# Patient Record
Sex: MALE | Race: WHITE | NOT HISPANIC OR LATINO | Employment: OTHER | URBAN - NONMETROPOLITAN AREA
[De-identification: names, ages, dates, MRNs, and addresses within clinical notes are randomized per-mention and may not be internally consistent; named-entity substitution may affect disease eponyms.]

---

## 2017-10-23 RX ORDER — ASPIRIN 81 MG/1
81 TABLET, CHEWABLE ORAL DAILY
COMMUNITY

## 2017-10-23 RX ORDER — OMEPRAZOLE 20 MG/1
20 CAPSULE, DELAYED RELEASE ORAL DAILY PRN
Status: ON HOLD | COMMUNITY
End: 2017-10-27

## 2017-10-23 RX ORDER — PREGABALIN 100 MG/1
100 CAPSULE ORAL 2 TIMES DAILY
COMMUNITY
End: 2018-03-13 | Stop reason: ALTCHOICE

## 2017-10-23 RX ORDER — MELOXICAM 15 MG/1
15 TABLET ORAL DAILY
COMMUNITY
End: 2020-01-08

## 2017-10-23 RX ORDER — ATENOLOL 25 MG/1
25 TABLET ORAL DAILY
COMMUNITY
End: 2020-01-08

## 2017-10-27 ENCOUNTER — ANESTHESIA (OUTPATIENT)
Dept: GASTROENTEROLOGY | Facility: HOSPITAL | Age: 52
End: 2017-10-27

## 2017-10-27 ENCOUNTER — ANESTHESIA EVENT (OUTPATIENT)
Dept: GASTROENTEROLOGY | Facility: HOSPITAL | Age: 52
End: 2017-10-27

## 2017-10-27 ENCOUNTER — HOSPITAL ENCOUNTER (OUTPATIENT)
Facility: HOSPITAL | Age: 52
Setting detail: HOSPITAL OUTPATIENT SURGERY
Discharge: HOME | End: 2017-10-27
Attending: INTERNAL MEDICINE | Admitting: INTERNAL MEDICINE

## 2017-10-27 VITALS
OXYGEN SATURATION: 98 % | WEIGHT: 269.25 LBS | BODY MASS INDEX: 38.55 KG/M2 | RESPIRATION RATE: 18 BRPM | HEIGHT: 70 IN | TEMPERATURE: 97.3 F | DIASTOLIC BLOOD PRESSURE: 56 MMHG | HEART RATE: 77 BPM | SYSTOLIC BLOOD PRESSURE: 119 MMHG

## 2017-10-27 PROCEDURE — 45378 DIAGNOSTIC COLONOSCOPY: CPT | Performed by: INTERNAL MEDICINE

## 2017-10-27 PROCEDURE — 25010000002 PROPOFOL 10 MG/ML EMULSION: Performed by: NURSE ANESTHETIST, CERTIFIED REGISTERED

## 2017-10-27 PROCEDURE — 25010000002 MIDAZOLAM PER 1 MG: Performed by: NURSE ANESTHETIST, CERTIFIED REGISTERED

## 2017-10-27 RX ORDER — DEXAMETHASONE SODIUM PHOSPHATE 4 MG/ML
8 INJECTION, SOLUTION INTRA-ARTICULAR; INTRALESIONAL; INTRAMUSCULAR; INTRAVENOUS; SOFT TISSUE ONCE AS NEEDED
Status: DISCONTINUED | OUTPATIENT
Start: 2017-10-27 | End: 2017-10-27 | Stop reason: HOSPADM

## 2017-10-27 RX ORDER — PROMETHAZINE HYDROCHLORIDE 25 MG/1
25 SUPPOSITORY RECTAL ONCE AS NEEDED
Status: DISCONTINUED | OUTPATIENT
Start: 2017-10-27 | End: 2017-10-27 | Stop reason: HOSPADM

## 2017-10-27 RX ORDER — DEXTROSE AND SODIUM CHLORIDE 5; .45 G/100ML; G/100ML
30 INJECTION, SOLUTION INTRAVENOUS CONTINUOUS
Status: DISCONTINUED | OUTPATIENT
Start: 2017-10-27 | End: 2017-10-27 | Stop reason: HOSPADM

## 2017-10-27 RX ORDER — PROPOFOL 10 MG/ML
VIAL (ML) INTRAVENOUS AS NEEDED
Status: DISCONTINUED | OUTPATIENT
Start: 2017-10-27 | End: 2017-10-27 | Stop reason: SURG

## 2017-10-27 RX ORDER — PROMETHAZINE HYDROCHLORIDE 25 MG/ML
12.5 INJECTION, SOLUTION INTRAMUSCULAR; INTRAVENOUS ONCE AS NEEDED
Status: DISCONTINUED | OUTPATIENT
Start: 2017-10-27 | End: 2017-10-27 | Stop reason: HOSPADM

## 2017-10-27 RX ORDER — LIDOCAINE HYDROCHLORIDE 10 MG/ML
INJECTION, SOLUTION INFILTRATION; PERINEURAL AS NEEDED
Status: DISCONTINUED | OUTPATIENT
Start: 2017-10-27 | End: 2017-10-27 | Stop reason: SURG

## 2017-10-27 RX ORDER — ONDANSETRON 2 MG/ML
4 INJECTION INTRAMUSCULAR; INTRAVENOUS ONCE AS NEEDED
Status: DISCONTINUED | OUTPATIENT
Start: 2017-10-27 | End: 2017-10-27 | Stop reason: HOSPADM

## 2017-10-27 RX ORDER — MIDAZOLAM HYDROCHLORIDE 1 MG/ML
INJECTION INTRAMUSCULAR; INTRAVENOUS AS NEEDED
Status: DISCONTINUED | OUTPATIENT
Start: 2017-10-27 | End: 2017-10-27 | Stop reason: SURG

## 2017-10-27 RX ORDER — PROMETHAZINE HYDROCHLORIDE 25 MG/1
25 TABLET ORAL ONCE AS NEEDED
Status: DISCONTINUED | OUTPATIENT
Start: 2017-10-27 | End: 2017-10-27 | Stop reason: HOSPADM

## 2017-10-27 RX ADMIN — PROPOFOL 100 MG: 10 INJECTION, EMULSION INTRAVENOUS at 15:42

## 2017-10-27 RX ADMIN — LIDOCAINE HYDROCHLORIDE 100 MG: 10 INJECTION, SOLUTION INFILTRATION; PERINEURAL at 15:42

## 2017-10-27 RX ADMIN — DEXTROSE AND SODIUM CHLORIDE 30 ML/HR: 5; 450 INJECTION, SOLUTION INTRAVENOUS at 14:06

## 2017-10-27 RX ADMIN — PROPOFOL 20 MG: 10 INJECTION, EMULSION INTRAVENOUS at 15:48

## 2017-10-27 RX ADMIN — PROPOFOL 30 MG: 10 INJECTION, EMULSION INTRAVENOUS at 15:45

## 2017-10-27 RX ADMIN — PROPOFOL 50 MG: 10 INJECTION, EMULSION INTRAVENOUS at 15:50

## 2017-10-27 RX ADMIN — MIDAZOLAM 2 MG: 1 INJECTION INTRAMUSCULAR; INTRAVENOUS at 15:40

## 2017-10-27 NOTE — PLAN OF CARE
Problem: Patient Care Overview (Adult)  Goal: Plan of Care Review    10/27/17 1551   Coping/Psychosocial Response Interventions   Plan Of Care Reviewed With patient   Patient Care Overview   Progress no change   Outcome Evaluation   Outcome Summary/Follow up Plan vss         Problem: GI Endoscopy (Adult)  Goal: Signs and Symptoms of Listed Potential Problems Will be Absent or Manageable (GI Endoscopy)    10/27/17 1551   GI Endoscopy   Problems Assessed (GI Endoscopy) all   Problems Present (GI Endoscopy) none

## 2017-10-27 NOTE — ANESTHESIA PREPROCEDURE EVALUATION
Anesthesia Evaluation     NPO Solid Status: > 8 hours  NPO Liquid Status: > 4 hours     Airway   Mallampati: III  TM distance: <3 FB  Neck ROM: full  difficult intubation highly probable  Dental          Pulmonary - normal exam   (+) sleep apnea on CPAP,   Cardiovascular - normal exam        Neuro/Psych  GI/Hepatic/Renal/Endo      Musculoskeletal     Abdominal  - normal exam  (+) obese,    Substance History      OB/GYN          Other                                        Anesthesia Plan    ASA 2     MAC     intravenous induction   Anesthetic plan and risks discussed with patient.

## 2017-10-27 NOTE — H&P
SUBJECTIVE:   10/27/2017    Name: Nicholas Negrete  DOD: 1965    REASON FOR CONSULT: screening colon    Chief Complaint:   No chief complaint on file.      Subjective     Patient is 52 y.o. male presents for screening     ROS/HISTORY/ CURRENT MEDICATIONS/OBJECTIVE/VS/PE:   Review of Systems:   Review of Systems    History:     Past Medical History:   Diagnosis Date   • Arthritis    • GERD (gastroesophageal reflux disease)    • Hypertension      Past Surgical History:   Procedure Laterality Date   • BACK SURGERY      L4 and L5 fused     History reviewed. No pertinent family history.  Social History   Substance Use Topics   • Smoking status: Never Smoker   • Smokeless tobacco: Never Used   • Alcohol use No     Prescriptions Prior to Admission   Medication Sig Dispense Refill Last Dose   • aspirin 81 MG chewable tablet Chew 81 mg Daily.   10/26/2017 at Unknown time   • atenolol (TENORMIN) 25 MG tablet Take 25 mg by mouth Daily.   10/26/2017 at Unknown time   • meloxicam (MOBIC) 15 MG tablet Take 15 mg by mouth Daily.   10/23/2017   • pregabalin (LYRICA) 100 MG capsule Take 100 mg by mouth 2 (Two) Times a Day.   10/26/2017 at Unknown time     Allergies:  Penicillins    I have reviewed the patients medical history, surgical history and family history in the available medical record system.     Current Medications:     Current Facility-Administered Medications   Medication Dose Route Frequency Provider Last Rate Last Dose   • dexamethasone (DECADRON) injection 8 mg  8 mg Intravenous Once PRN Meryl Victoria CRNA       • dextrose 5 % and sodium chloride 0.45 % infusion  30 mL/hr Intravenous Continuous Tai Hayes MD 30 mL/hr at 10/27/17 1406 30 mL/hr at 10/27/17 1406   • meperidine (DEMEROL) injection 12.5 mg  12.5 mg Intravenous Q5 Min PRN Meryl Victoria CRNA       • ondansetron (ZOFRAN) injection 4 mg  4 mg Intravenous Once PRN Meryl Victoria CRNA       • promethazine (PHENERGAN) injection  12.5 mg  12.5 mg Intravenous Once PRN Meryl Victoria CRNA        Or   • promethazine (PHENERGAN) injection 12.5 mg  12.5 mg Intramuscular Once PRN Meryl Victoria CRNA        Or   • promethazine (PHENERGAN) suppository 25 mg  25 mg Rectal Once PRN Meryl Victoria CRNA        Or   • promethazine (PHENERGAN) tablet 25 mg  25 mg Oral Once PRN Meryl Victoria CRNA           Objective     Physical Exam:   Temp:  [97.9 °F (36.6 °C)] 97.9 °F (36.6 °C)  Heart Rate:  [96] 96  Resp:  [18] 18  BP: (167)/(103) 167/103    Physical Exam:  General Appearance:    Alert, cooperative, in no acute distress   Head:    Normocephalic, without obvious abnormality, atraumatic   Eyes:            Lids and lashes normal, conjunctivae and sclerae normal, no   icterus, no pallor, corneas clear, PERRLA   Ears:    Ears appear intact with no abnormalities noted   Throat:   No oral lesions, no thrush, oral mucosa moist   Neck:   No adenopathy, supple, trachea midline, no thyromegaly, no     carotid bruit, no JVD   Back:     No kyphosis present, no scoliosis present, no skin lesions,       erythema or scars, no tenderness to percussion or                   palpation,   range of motion normal   Lungs:     Clear to auscultation,respirations regular, even and                   unlabored    Heart:    Regular rhythm and normal rate, normal S1 and S2, no            murmur, no gallop, no rub, no click   Breast Exam:    Deferred   Abdomen:     Normal bowel sounds, no masses, no organomegaly, soft        non-tender, non-distended, no guarding, no rebound                 tenderness   Genitalia:    Deferred   Extremities:   Moves all extremities well, no edema, no cyanosis, no              redness   Pulses:   Pulses palpable and equal bilaterally   Skin:   No bleeding, bruising or rash   Lymph nodes:   No palpable adenopathy   Neurologic:   Cranial nerves 2 - 12 grossly intact, sensation intact, DTR        present and equal bilaterally       Results Review:     No results found for: WBC, HGB, HCT, PLT          No results found for: LIPASE  No results found for: INR      Radiology Review:  Imaging Results (last 72 hours)     ** No results found for the last 72 hours. **           I reviewed the patient's new clinical results.  I reviewed the patient's new imaging results and agree with the interpretation.     ASSESSMENT/PLAN:   ASSESSMENT: pt for screening colonoscopy    PLAN: screening colonoscopy  The risks, benefits, and alternatives of this procedure have been discussed with the patient or the responsible party- the patient understands and agrees to proceed.         Tai Hayes MD  10/27/17  3:09 PM         This document has been electronically signed by Tai Hayes MD on October 27, 2017 3:09 PM

## 2017-10-27 NOTE — PLAN OF CARE
Problem: Patient Care Overview (Adult)  Goal: Plan of Care Review  Outcome: Outcome(s) achieved Date Met:  10/27/17    10/27/17 1606   Coping/Psychosocial Response Interventions   Plan Of Care Reviewed With patient   Patient Care Overview   Progress no change         Problem: GI Endoscopy (Adult)  Goal: Signs and Symptoms of Listed Potential Problems Will be Absent or Manageable (GI Endoscopy)  Outcome: Outcome(s) achieved Date Met:  10/27/17    10/27/17 1606   GI Endoscopy   Problems Assessed (GI Endoscopy) all   Problems Present (GI Endoscopy) none

## 2017-10-27 NOTE — PLAN OF CARE
Problem: Patient Care Overview (Adult)  Goal: Plan of Care Review    10/27/17 1552   Coping/Psychosocial Response Interventions   Plan Of Care Reviewed With patient   Patient Care Overview   Progress no change

## 2018-03-13 RX ORDER — ZOLPIDEM TARTRATE 10 MG/1
5 TABLET ORAL DAILY
COMMUNITY

## 2018-03-13 RX ORDER — LISINOPRIL 40 MG/1
40 TABLET ORAL DAILY
COMMUNITY
End: 2020-01-08

## 2018-03-27 ENCOUNTER — OFFICE VISIT (OUTPATIENT)
Dept: BARIATRICS/WEIGHT MGMT | Facility: CLINIC | Age: 53
End: 2018-03-27

## 2018-03-27 ENCOUNTER — OFFICE VISIT (OUTPATIENT)
Dept: BARIATRICS/WEIGHT MGMT | Facility: HOSPITAL | Age: 53
End: 2018-03-27

## 2018-03-27 VITALS
BODY MASS INDEX: 40.76 KG/M2 | SYSTOLIC BLOOD PRESSURE: 151 MMHG | WEIGHT: 275.2 LBS | TEMPERATURE: 99.8 F | HEIGHT: 69 IN | OXYGEN SATURATION: 100 % | DIASTOLIC BLOOD PRESSURE: 78 MMHG | HEART RATE: 75 BPM

## 2018-03-27 DIAGNOSIS — G47.33 OSA ON CPAP: ICD-10-CM

## 2018-03-27 DIAGNOSIS — I10 ESSENTIAL HYPERTENSION: ICD-10-CM

## 2018-03-27 DIAGNOSIS — E66.01 OBESITY, CLASS III, BMI 40-49.9 (MORBID OBESITY) (HCC): Primary | ICD-10-CM

## 2018-03-27 DIAGNOSIS — Z99.89 OSA ON CPAP: ICD-10-CM

## 2018-03-27 DIAGNOSIS — Z71.89 PRE-BARIATRIC SURGERY PSYCHOLOGICAL EVALUATION: ICD-10-CM

## 2018-03-27 PROCEDURE — 99203 OFFICE O/P NEW LOW 30 MIN: CPT | Performed by: NURSE PRACTITIONER

## 2018-03-27 PROCEDURE — 97802 MEDICAL NUTRITION INDIV IN: CPT

## 2018-03-27 NOTE — PATIENT INSTRUCTIONS
Nicholas Negrete is a  52 y.o. who has morbid obesity with BMI of 40.6 and desires surgical weight loss. Patient has the following comorbidities: HTN, LORI, joint, back pain.  Patient has been advised that this surgery is considered to be elective major surgery that is typically done laparoscopically. Patient is a potentially good surgical candidate. Patient will need to meet with the Bariatric Surgeon to further discuss surgical options. Patient has been advised that they will need to have a work-up prior to surgery. This work-up will include an EGD to assess for H. Pylori and Lopez's esophagus. Additionally, patient will need to have a psychological evaluation and clearance prior to surgery. Patient will need the following additional clearances/testing: EKG. Baseline lab work will not be obtained today as PCP is following. Patient will see the dietician today for make specific goals for diet, exercise, and lifestyle. Patient has received intensive behavioral therapy for obesity today. I will have them follow up in one month for a weight recheck and to further discuss options.

## 2018-04-02 ENCOUNTER — TELEPHONE (OUTPATIENT)
Dept: BARIATRICS/WEIGHT MGMT | Facility: CLINIC | Age: 53
End: 2018-04-02

## 2018-04-02 NOTE — TELEPHONE ENCOUNTER
I called the patient to clarify his [facility] coverage and he said unfortunately that he can only see [facility] providers and go to [facility] facilities.

## 2018-04-20 ENCOUNTER — TRANSCRIBE ORDERS (OUTPATIENT)
Dept: LAB | Facility: HOSPITAL | Age: 53
End: 2018-04-20

## 2018-04-20 ENCOUNTER — LAB (OUTPATIENT)
Dept: LAB | Facility: HOSPITAL | Age: 53
End: 2018-04-20

## 2018-04-20 ENCOUNTER — RESULTS ENCOUNTER (OUTPATIENT)
Dept: BARIATRICS/WEIGHT MGMT | Facility: CLINIC | Age: 53
End: 2018-04-20

## 2018-04-20 DIAGNOSIS — Z30.2 ADMISSION FOR STERILIZATION: Primary | ICD-10-CM

## 2018-04-20 DIAGNOSIS — E66.01 OBESITY, CLASS III, BMI 40-49.9 (MORBID OBESITY) (HCC): ICD-10-CM

## 2018-04-20 DIAGNOSIS — Z30.2 ADMISSION FOR STERILIZATION: ICD-10-CM

## 2018-04-20 LAB
MOTILITY - POST VASECTOMY: ABNORMAL
SPERM - POST VASECTOMY: ABNORMAL

## 2018-04-20 PROCEDURE — 89321 SEMEN ANAL SPERM DETECTION: CPT

## 2018-05-18 ENCOUNTER — RESULTS ENCOUNTER (OUTPATIENT)
Dept: BARIATRICS/WEIGHT MGMT | Facility: CLINIC | Age: 53
End: 2018-05-18

## 2018-05-18 DIAGNOSIS — E66.01 OBESITY, CLASS III, BMI 40-49.9 (MORBID OBESITY) (HCC): ICD-10-CM

## 2018-06-15 ENCOUNTER — RESULTS ENCOUNTER (OUTPATIENT)
Dept: BARIATRICS/WEIGHT MGMT | Facility: CLINIC | Age: 53
End: 2018-06-15

## 2018-06-15 DIAGNOSIS — E66.01 OBESITY, CLASS III, BMI 40-49.9 (MORBID OBESITY) (HCC): ICD-10-CM

## 2018-07-13 ENCOUNTER — RESULTS ENCOUNTER (OUTPATIENT)
Dept: BARIATRICS/WEIGHT MGMT | Facility: CLINIC | Age: 53
End: 2018-07-13

## 2018-07-13 DIAGNOSIS — E66.01 OBESITY, CLASS III, BMI 40-49.9 (MORBID OBESITY) (HCC): ICD-10-CM

## 2018-08-10 ENCOUNTER — RESULTS ENCOUNTER (OUTPATIENT)
Dept: BARIATRICS/WEIGHT MGMT | Facility: CLINIC | Age: 53
End: 2018-08-10

## 2018-08-10 DIAGNOSIS — E66.01 OBESITY, CLASS III, BMI 40-49.9 (MORBID OBESITY) (HCC): ICD-10-CM

## 2018-09-07 ENCOUNTER — RESULTS ENCOUNTER (OUTPATIENT)
Dept: BARIATRICS/WEIGHT MGMT | Facility: CLINIC | Age: 53
End: 2018-09-07

## 2018-09-07 DIAGNOSIS — E66.01 OBESITY, CLASS III, BMI 40-49.9 (MORBID OBESITY) (HCC): ICD-10-CM

## 2018-10-05 ENCOUNTER — RESULTS ENCOUNTER (OUTPATIENT)
Dept: BARIATRICS/WEIGHT MGMT | Facility: CLINIC | Age: 53
End: 2018-10-05

## 2018-10-05 DIAGNOSIS — E66.01 OBESITY, CLASS III, BMI 40-49.9 (MORBID OBESITY) (HCC): ICD-10-CM

## 2018-11-02 ENCOUNTER — RESULTS ENCOUNTER (OUTPATIENT)
Dept: BARIATRICS/WEIGHT MGMT | Facility: CLINIC | Age: 53
End: 2018-11-02

## 2018-11-02 DIAGNOSIS — E66.01 OBESITY, CLASS III, BMI 40-49.9 (MORBID OBESITY) (HCC): ICD-10-CM

## 2018-11-30 ENCOUNTER — RESULTS ENCOUNTER (OUTPATIENT)
Dept: BARIATRICS/WEIGHT MGMT | Facility: CLINIC | Age: 53
End: 2018-11-30

## 2018-11-30 DIAGNOSIS — E66.01 OBESITY, CLASS III, BMI 40-49.9 (MORBID OBESITY) (HCC): ICD-10-CM

## 2018-12-28 ENCOUNTER — RESULTS ENCOUNTER (OUTPATIENT)
Dept: BARIATRICS/WEIGHT MGMT | Facility: CLINIC | Age: 53
End: 2018-12-28

## 2018-12-28 DIAGNOSIS — E66.01 OBESITY, CLASS III, BMI 40-49.9 (MORBID OBESITY) (HCC): ICD-10-CM

## 2019-01-25 ENCOUNTER — RESULTS ENCOUNTER (OUTPATIENT)
Dept: BARIATRICS/WEIGHT MGMT | Facility: CLINIC | Age: 54
End: 2019-01-25

## 2019-01-25 DIAGNOSIS — E66.01 OBESITY, CLASS III, BMI 40-49.9 (MORBID OBESITY) (HCC): ICD-10-CM

## 2019-02-07 ENCOUNTER — TRANSCRIBE ORDERS (OUTPATIENT)
Dept: PHYSICAL THERAPY | Facility: HOSPITAL | Age: 54
End: 2019-02-07

## 2019-02-07 DIAGNOSIS — M25.562 LEFT KNEE PAIN, UNSPECIFIED CHRONICITY: Primary | ICD-10-CM

## 2019-02-07 DIAGNOSIS — M17.12 ARTHRITIS OF LEFT KNEE: ICD-10-CM

## 2019-02-08 ENCOUNTER — HOSPITAL ENCOUNTER (OUTPATIENT)
Dept: PHYSICAL THERAPY | Facility: HOSPITAL | Age: 54
Setting detail: THERAPIES SERIES
Discharge: HOME | End: 2019-02-08

## 2019-02-08 DIAGNOSIS — M17.12 PRIMARY OSTEOARTHRITIS OF LEFT KNEE: ICD-10-CM

## 2019-02-08 DIAGNOSIS — Z96.652 TOTAL KNEE REPLACEMENT STATUS, LEFT: Primary | ICD-10-CM

## 2019-02-08 PROCEDURE — 97110 THERAPEUTIC EXERCISES: CPT | Performed by: PHYSICAL THERAPIST

## 2019-02-08 PROCEDURE — G0283 ELEC STIM OTHER THAN WOUND: HCPCS | Performed by: PHYSICAL THERAPIST

## 2019-02-08 PROCEDURE — 97161 PT EVAL LOW COMPLEX 20 MIN: CPT | Performed by: PHYSICAL THERAPIST

## 2019-02-08 NOTE — THERAPY EVALUATION
Outpatient Physical Therapy Ortho Initial Evaluation  [facility]     Patient Name: Nicholas Negrete  : 1965  MRN: [id number]  Today's Date: 2019      Visit Date: 2019      Attendance    Authorized 17   Pre Rx pain 6   Post Rx pain 4-5   % improvement 0   MD follow up    Recert date            Subjective Evaluation    History of Present Illness  Onset date: over a year.  Date of surgery: 2019    Subjective comment: Has a CPM at home.  [facility]  MMT, with scope.  Patient Occupation: Retired/ Disabled [facility] Quality of life: good    Pain  Current pain ratin  At best pain ratin  At worst pain ratin  Location: left knee  Quality: dull ache, throbbing and pressure  Relieving factors: ice and rest (CPM)  Aggravating factors: ambulation, stairs, standing and movement  Progression: improved    Social Support  Lives in: one-story house  Lives with: spouse and young children    Hand dominance: right    Diagnostic Tests  X-ray: abnormal    Treatments  Previous treatment: injection treatment  Patient Goals  Patient goals for therapy: decreased edema, decreased pain, increased motion, increased strength and independence with ADLs/IADLs               Past Medical History:   Diagnosis Date   • Anxiety    • Arthritis    • Back pain    • Fibromyalgia    • GERD (gastroesophageal reflux disease)    • Hemorrhoids    • Hx of heartburn    • Hypertension    • Pain     hip, joint, neck, knee, back    • Problems with hearing    • Sleep apnea    • Sleep apnea     uses Cpap/Bipap         Past Surgical History:   Procedure Laterality Date   • BACK SURGERY      L4 and L5 fused   • BACK SURGERY     • COLONOSCOPY N/A 10/27/2017    Procedure: COLONOSCOPY;  Surgeon: Tai Hayes MD;  Location: [facility] ENDOSCOPY;  Service:      Medications (Admitted on 2019)    aspirin 81 MG chewable tablet    atenolol (TENORMIN) 25 MG tablet    lisinopril (PRINIVIL,ZESTRIL) 40 MG  tablet    meloxicam (MOBIC) 15 MG tablet    OMEPRAZOLE PO    Pregabalin (LYRICA PO)    zolpidem (AMBIEN) 10 MG tablet               Norco  ALLERGIES: Penicillins    Visit Dx:     ICD-10-CM ICD-9-CM   1. Total knee replacement status, left Z96.652 V43.65   2. Primary osteoarthritis of left knee M17.12 715.16           Objective       Observations   Left Knee   Positive for incision.     Additional Observation Details  Covered with non occlusive bandage.    Neurological Testing     Sensation     Knee   Left Knee   Diminished: light touch     Comments   Left light touch: lateral knee.     Active Range of Motion   Left Knee   Flexion: 78 (90 post heelslides) degrees     Patellar Static Positioning   Left Knee: WFL  Right Knee: WFL    Strength/Myotome Testing     Left Knee   Quadriceps contraction: good    Swelling     Left Knee Girth Measurement (cm)   Joint line: 39.5.    Ambulation   Weight-Bearing Status   Weight-Bearing Status (Left): weight-bearing as tolerated   Weight-Bearing Status (Right): full weight-bearing    Assistive device used: front-wheeled walker                           Assessment & Plan     Assessment  Impairments: abnormal gait, abnormal or restricted ROM, activity intolerance, lacks appropriate home exercise program and pain with function  Assessment details: Post op status TKA  Sx 2/4 .  Skilled therapy indicated for post operative care of wound, gait, ROM and strength  Prognosis: good  Functional Limitations: walking and standing  Goals  Plan Goals: ST. Pt will have independence in HEP  2. Pt will have AROM 105 degrees  Knee flexion  3. Pt will have 0 deg AROM knee ext.  4. Pt will amb with cane in 3 weeks.  5. Pt will be able to SLR with no lag    PT goal  1. Be able to play recreational sports things with kids.  LT.Pt will have 120 deg knee flexion  2.Pt will ambulate with NAG and no AD.   3.Pt will have decreased swelling to 38 cm at MJL  4. Pt will have 5/5 MMT quads and hams  5.  Pt will be able to do 10 sit to stands without UE support        Plan  Therapy options: will be seen for skilled physical therapy services  Planned modality interventions: cryotherapy and electrical stimulation/Russian stimulation  Planned therapy interventions: stretching, strengthening, gait training and home exercise program  Frequency: 3x week  Duration in weeks: 3  Plan details: 3x3, 2 x3 1x2.   ROM , strength and  Gait. Edema control, Ice and stim.         PT OP Goals     Row Name 02/08/19 0937          PT Short Term Goals    STG Date to Achieve  03/01/19  -DD     STG 2  Patient will be independent home exercise program patient will have active range of motion 105 degrees left knee  -DD     STG 3  Patient will have 0 degrees active range of motion left knee extension  -DD     STG 4  Patient was able to ambulate with a cane in 3 weeks  -DD     STG 5  Patient will be able to straight leg raise without extensor lag  -DD        Long Term Goals    LTG 1  Patient will have 120 degrees active knee flexion  -DD     LTG 2  Patient will ambulate with nonantalgic gait and no assistive device  -DD     LTG 3  Patient will have decreased swelling to 38 cm at the medial joint line  -DD     LTG 4  Patient will have 5/5 manual muscle testing of quads and hamstrings  -DD     LTG 5  Patient be able to do sit to stands 10 reps without upper extremity support  -DD        Time Calculation    PT Goal Re-Cert Due Date  03/01/19  -DD       User Key  (r) = Recorded By, (t) = Taken By, (c) = Cosigned By    Initials Name Provider Type    DD Jayashree Encinas, PT DPT Physical Therapist              Modalities     Row Name 02/08/19 0900             Ice    Ice Applied  Yes  -DD      Location  left knee  -DD      Rx Minutes  15 mins  -DD      Ice S/P Rx  Yes  -DD         ELECTRICAL STIMULATION    Attended/Unattended  Unattended  -DD      Stimulation Type  IFC 15 min  -DD      Max mAmp  25  -DD      Location/Electrode Placement/Other   "left knee  -DD        User Key  (r) = Recorded By, (t) = Taken By, (c) = Cosigned By    Initials Name Provider Type    Jayashree Metz, PT DPT Physical Therapist        Exercises     Row Name 02/08/19 0900             Exercise 1    Exercise Name 1  hamstring/calf stretch with strap  -DD      Reps 1  2/30\"  -DD         Exercise 2    Exercise Name 2  quad sets  -DD      Reps 2  20  -DD         Exercise 3    Exercise Name 3  SLR  -DD      Reps 3  20  -DD         Exercise 4    Exercise Name 4  SAQ  -DD      Reps 4  20  -DD         Exercise 5    Exercise Name 5  iso add  -DD      Reps 5  20  -DD         Exercise 6    Exercise Name 6  LAQ  -DD      Reps 6  20  -DD         Exercise 7    Exercise Name 7  heel slides with strap  -DD      Reps 7  20  -DD        User Key  (r) = Recorded By, (t) = Taken By, (c) = Cosigned By    Initials Name Provider Type    Jayashree Metz, PT DPT Physical Therapist            Outcome Measure Options: Lower Extremity Functional Scale (LEFS)  Lower Extremity Functional Index  Any of your usual work, housework or school activities: Quite a bit of difficulty  Your usual hobbies, recreational or sporting activities: Extreme difficulty or unable to perform activity  Getting into or out of the bath: Quite a bit of difficulty  Walking between rooms: Moderate difficulty  Putting on your shoes or socks: Quite a bit of difficulty  Squatting: Extreme difficulty or unable to perform activity  Lifting an object, like a bag of groceries from the floor: Extreme difficulty or unable to perform activity  Performing light activities around your home: Quite a bit of difficulty  Performing heavy activities around your home: Extreme difficulty or unable to perform activity  Getting into or out of a car: Quite a bit of difficulty  Walking 2 blocks: Quite a bit of difficulty  Walking a mile: Extreme difficulty or unable to perform activity  Going up or down 10 stairs (about 1 flight of stairs): " Quite a bit of difficulty  Standing for 1 hour: Extreme difficulty or unable to perform activity  Sitting for 1 hour: Extreme difficulty or unable to perform activity  Running on even ground: Extreme difficulty or unable to perform activity  Running on uneven ground: Extreme difficulty or unable to perform activity  Making sharp turns while running fast: Extreme difficulty or unable to perform activity  Hopping: Extreme difficulty or unable to perform activity  Rolling over in bed: Quite a bit of difficulty  Total: 10    Time Calculation:     Therapy Suggested Charges     Code   Minutes Charges    None             Start Time: 0845  Stop Time: 0947  Time Calculation (min): 62 min  Total Timed Code Minutes- PT: 25 minute(s)     Therapy Charges for Today     Code Description Service Date Service Provider Modifiers Qty    42426888092 HC PT EVAL LOW COMPLEXITY 1 2/8/2019 Jayashree Encinas, PT DPT GP 1    71777914873 HC PT THER PROC EA 15 MIN 2/8/2019 Jayashree Encinas, PT DPT GP 2    36689570353 HC PT ELECTRICAL STIM UNATTENDED 2/8/2019 Jayashree Encinas, PT DPT  1        PT G-Codes  Outcome Measure Options: Lower Extremity Functional Scale (LEFS)  Total: 10     Jayashree Encinas, PT DPT  2/8/2019

## 2019-02-11 ENCOUNTER — HOSPITAL ENCOUNTER (OUTPATIENT)
Dept: PHYSICAL THERAPY | Facility: HOSPITAL | Age: 54
Setting detail: THERAPIES SERIES
Discharge: HOME | End: 2019-02-11

## 2019-02-11 DIAGNOSIS — Z96.652 TOTAL KNEE REPLACEMENT STATUS, LEFT: Primary | ICD-10-CM

## 2019-02-11 PROCEDURE — G0283 ELEC STIM OTHER THAN WOUND: HCPCS

## 2019-02-11 PROCEDURE — 97110 THERAPEUTIC EXERCISES: CPT

## 2019-02-11 NOTE — THERAPY TREATMENT NOTE
Outpatient Physical Therapy Ortho Treatment Note  [facility]     Patient Name: Nicholas Negrete  : 1965  MRN: [id number]  Today's Date: 2019      Visit Date: 2019   Attendance:   Subjective improvement: 65%  Recert:19  MD Appointment: 19      Visit Dx:    ICD-10-CM ICD-9-CM   1. Total knee replacement status, left Z96.652 V43.65       There is no problem list on file for this patient.       Past Medical History:   Diagnosis Date   • Anxiety    • Arthritis    • Back pain    • Fibromyalgia    • GERD (gastroesophageal reflux disease)    • Hemorrhoids    • Hx of heartburn    • Hypertension    • Pain     hip, joint, neck, knee, back    • Problems with hearing    • Sleep apnea    • Sleep apnea     uses Cpap/Bipap         Past Surgical History:   Procedure Laterality Date   • BACK SURGERY      L4 and L5 fused   • BACK SURGERY     • COLONOSCOPY N/A 10/27/2017    Procedure: COLONOSCOPY;  Surgeon: Tai Hayes MD;  Location: [facility] ENDOSCOPY;  Service:        PT Ortho     Row Name 19 0900       Subjective Pain    Post-Treatment Pain Level  4  -EM       Posture/Observations    Posture/Observations Comments  Pt amb with FWRW with step throught gt pattern lacking full knee ext. Mod edema noted. Bandage intact.   -EM       General ROM    GENERAL ROM COMMENTS  L knee: 4-100 AAROM supine  -EM      User Key  (r) = Recorded By, (t) = Taken By, (c) = Cosigned By    Initials Name Provider Type    EM Raghu Jewell, PTA Physical Therapy Assistant                      PT Assessment/Plan     Row Name 19 1000          PT Assessment    Assessment Comments  Pt lashawn tx well with improved ROM this tx.   -EM        PT Plan    PT Frequency  3x/week 3x/wk x 3 wks, 2x/wk x 3 wks, 1x/wk x 2wk  -EM     PT Plan Comments  Cont current POC, work on knee ext. Cont to progress as lashawn.   -EM       User Key  (r) = Recorded By, (t) = Taken By, (c) = Cosigned By    Initials Name Provider  "Type    EM Raghu Jewell PTA Physical Therapy Assistant          Modalities     Row Name 02/11/19 0900             ELECTRICAL STIMULATION    Attended/Unattended  Unattended  -EM      Stimulation Type  IFC w/ ice/elev  -EM      Max mAmp  21  -EM      Location/Electrode Placement/Other  left knee 15'  -EM        User Key  (r) = Recorded By, (t) = Taken By, (c) = Cosigned By    Initials Name Provider Type    EM Raghu Jewell PTA Physical Therapy Assistant          Exercises     Row Name 02/11/19 0900             Subjective Comments    Subjective Comments  Pt been compliant with HEP and cryotherapy. Eager to get better.   -EM         Subjective Pain    Able to rate subjective pain?  yes  -EM      Pre-Treatment Pain Level  6  -EM      Post-Treatment Pain Level  4  -EM         Exercise 1    Exercise Name 1  PRO II-Seat 9-  -EM      Time 1  10'  -EM         Exercise 2    Exercise Name 2  Incline Calf S  -EM      Sets 2  3  -EM      Time 2  30\"  -EM         Exercise 3    Exercise Name 3  St Ham S  -EM      Sets 3  3  -EM      Time 3  30\"  -EM         Exercise 4    Exercise Name 4  St Knee Flexion S  -EM      Sets 4  3  -EM      Time 4  30\"   -EM         Exercise 5    Exercise Name 5  LAQ   -EM      Sets 5  1  -EM      Reps 5  30  -EM         Exercise 6    Exercise Name 6  Hip Flexion   -EM      Sets 6  1  -EM      Reps 6  30  -EM         Exercise 7    Exercise Name 7  SAQ  -EM      Sets 7  1  -EM      Reps 7  30  -EM         Exercise 8    Exercise Name 8  Sup VMO SLR   -EM      Sets 8  1  -EM      Reps 8  30  -EM         Exercise 9    Exercise Name 9  HS w/ strap  -EM      Sets 9  1  -EM      Reps 9  30  -EM         Exercise 10    Exercise Name 10  QS w/ heelprop  -EM      Sets 10  1  -EM      Reps 10  30  -EM         Exercise 11    Exercise Name 11  IFC w/ ice/elev  -EM      Time 11  15'  -EM        User Key  (r) = Recorded By, (t) = Taken By, (c) = Cosigned By    Initials Name Provider Type    Raghu Estrada PTA " Physical Therapy Assistant                         PT OP Goals     Row Name 02/11/19 1000          PT Short Term Goals    STG Date to Achieve  03/01/19  -EM     STG 2  Patient will be independent home exercise program patient will have active range of motion 105 degrees left knee  -EM     STG 2 Progress  Not Met  -EM     STG 3  Patient will have 0 degrees active range of motion left knee extension  -EM     STG 3 Progress  Not Met  -EM     STG 4  Patient was able to ambulate with a cane in 3 weeks  -EM     STG 4 Progress  Not Met  -EM     STG 5  Patient will be able to straight leg raise without extensor lag  -EM     STG 5 Progress  Not Met  -EM        Long Term Goals    LTG 1  Patient will have 120 degrees active knee flexion  -EM     LTG 1 Progress  Not Met  -EM     LTG 2  Patient will ambulate with nonantalgic gait and no assistive device  -EM     LTG 2 Progress  Not Met  -EM     LTG 3  Patient will have decreased swelling to 38 cm at the medial joint line  -EM     LTG 3 Progress  Not Met  -EM     LTG 4  Patient will have 5/5 manual muscle testing of quads and hamstrings  -EM     LTG 4 Progress  Not Met  -EM     LTG 5  Patient be able to do sit to stands 10 reps without upper extremity support  -EM     LTG 5 Progress  Not Met  -EM        Time Calculation    PT Goal Re-Cert Due Date  03/01/19  -EM       User Key  (r) = Recorded By, (t) = Taken By, (c) = Cosigned By    Initials Name Provider Type    EM Raghu Jewell, NATHALIE Physical Therapy Assistant                         Time Calculation:   Start Time: 0933  Stop Time: 1047  Time Calculation (min): 74 min  Total Timed Code Minutes- PT: 74 minute(s)  Therapy Suggested Charges     Code   Minutes Charges    None           Therapy Charges for Today     Code Description Service Date Service Provider Modifiers Qty    36052642279  PT THER PROC EA 15 MIN 2/11/2019 Raghu Jewell, PTA GP 4    63401708220  PT ELECTRICAL STIM UNATTENDED 2/11/2019 Raghu Jewell, PTA  1                     Raghu Jewell, PTA  2/11/2019

## 2019-02-12 ENCOUNTER — APPOINTMENT (OUTPATIENT)
Dept: PHYSICAL THERAPY | Facility: HOSPITAL | Age: 54
End: 2019-02-12

## 2019-02-14 ENCOUNTER — HOSPITAL ENCOUNTER (OUTPATIENT)
Dept: PHYSICAL THERAPY | Facility: HOSPITAL | Age: 54
Setting detail: THERAPIES SERIES
Discharge: HOME | End: 2019-02-14

## 2019-02-14 DIAGNOSIS — Z96.652 TOTAL KNEE REPLACEMENT STATUS, LEFT: Primary | ICD-10-CM

## 2019-02-14 PROCEDURE — 97110 THERAPEUTIC EXERCISES: CPT

## 2019-02-14 PROCEDURE — 97116 GAIT TRAINING THERAPY: CPT

## 2019-02-14 PROCEDURE — G0283 ELEC STIM OTHER THAN WOUND: HCPCS

## 2019-02-14 NOTE — THERAPY TREATMENT NOTE
Outpatient Physical Therapy Ortho Treatment Note  [facility]     Patient Name: Nicholas Negrete  : 1965  MRN: [id number]  Today's Date: 2019      Visit Date: 2019   Attendance: 3/3  Subjective improvement: 65%  Recert: 19  MD Appointment: 19      Visit Dx:    ICD-10-CM ICD-9-CM   1. Total knee replacement status, left Z96.652 V43.65       There is no problem list on file for this patient.       Past Medical History:   Diagnosis Date   • Anxiety    • Arthritis    • Back pain    • Fibromyalgia    • GERD (gastroesophageal reflux disease)    • Hemorrhoids    • Hx of heartburn    • Hypertension    • Pain     hip, joint, neck, knee, back    • Problems with hearing    • Sleep apnea    • Sleep apnea     uses Cpap/Bipap         Past Surgical History:   Procedure Laterality Date   • BACK SURGERY      L4 and L5 fused   • BACK SURGERY     • COLONOSCOPY N/A 10/27/2017    Procedure: COLONOSCOPY;  Surgeon: Tai Hayes MD;  Location: [facility] ENDOSCOPY;  Service:        PT Ortho     Row Name 19 0700       Subjective Pain    Post-Treatment Pain Level  4  -EM       Posture/Observations    Posture/Observations Comments  Pt amb with FWRW, amb throughout tx with SC with step through gt pattern with mild antalgic gt.,   -EM       General ROM    GENERAL ROM COMMENTS  L knee: 2-103 AAROM supine  -EM      User Key  (r) = Recorded By, (t) = Taken By, (c) = Cosigned By    Initials Name Provider Type    EM Raghu Jewell, PTA Physical Therapy Assistant                      PT Assessment/Plan     Row Name 19 0800          PT Assessment    Assessment Comments  Pt lashawn tx well with progressed therex regime. Pt had improved ROM this tx with both flexion and ext. Pt performed gt well with SC.   -EM        PT Plan    PT Frequency  3x/week 3x/wk x 3 wks, 2x/wk x 3 wks, 1x/wk x2 wks  -EM     PT Plan Comments  Update HEP next tx.   -EM       User Key  (r) = Recorded By, (t) = Taken By,  "(c) = Cosigned By    Initials Name Provider Type    EM Raghu Jewell, NATHALIE Physical Therapy Assistant          Modalities     Row Name 02/14/19 0700             ELECTRICAL STIMULATION    Attended/Unattended  Unattended  -EM      Stimulation Type  IFC w/ ice/elev  -EM      Max mAmp  24  -EM      Location/Electrode Placement/Other  left knee  -EM        User Key  (r) = Recorded By, (t) = Taken By, (c) = Cosigned By    Initials Name Provider Type    EM Raghu Jewell, NATHALIE Physical Therapy Assistant          Exercises     Row Name 02/14/19 0700             Subjective Comments    Subjective Comments  Pt reports last tx went well \"Im stiff this morning\"  -EM         Subjective Pain    Able to rate subjective pain?  yes  -EM      Pre-Treatment Pain Level  6  -EM      Post-Treatment Pain Level  4  -EM         Exercise 1    Exercise Name 1  PRO II-Seat 9-  -EM      Time 1  10'  -EM         Exercise 2    Exercise Name 2  Incline Calf S  -EM      Sets 2  3  -EM      Time 2  30\"  -EM         Exercise 3    Exercise Name 3  St Ham S  -EM      Sets 3  3  -EM      Time 3  30\"  -EM         Exercise 4    Exercise Name 4  St Knee Flexion S  -EM      Sets 4  3  -EM      Time 4  30\"   -EM         Exercise 5    Exercise Name 5  Gt w/ SC  -EM      Reps 5  3 Laps  -EM         Exercise 6    Exercise Name 6  Fwd Step Up-4\"  -EM      Sets 6  1  -EM      Reps 6  20  -EM         Exercise 7    Exercise Name 7  MS  -EM      Sets 7  1  -EM      Reps 7  20  -EM         Exercise 8    Exercise Name 8  Offstep CR  -EM      Sets 8  1  -EM      Reps 8  20  -EM         Exercise 9    Exercise Name 9  LAQ  -EM      Sets 9  1  -EM      Reps 9  30  -EM         Exercise 10    Exercise Name 10  Seated Hip Flexion   -EM      Sets 10  1  -EM      Reps 10  30  -EM         Exercise 11    Exercise Name 11  SAQ  -EM      Sets 11  1  -EM      Reps 11  30  -EM         Exercise 12    Exercise Name 12  Sup SLR   -EM      Reps 12  1x30  -EM         Exercise 13    " Exercise Name 13  Sup VMO SLR   -EM      Sets 13  1  -EM      Reps 13  30  -EM         Exercise 14    Exercise Name 14  QS w/ heelprop  -EM      Sets 14  1  -EM      Reps 14  30  -EM         Exercise 15    Exercise Name 15  HS w/ strap  -EM      Sets 15  1  -EM      Reps 15  30  -EM         Exercise 16    Exercise Name 16  IFC w/ ice/elev  -EM      Time 16  15'  -EM        User Key  (r) = Recorded By, (t) = Taken By, (c) = Cosigned By    Initials Name Provider Type    EM Raghu Jewell, PTA Physical Therapy Assistant                         PT OP Goals     Row Name 02/14/19 0800          PT Short Term Goals    STG Date to Achieve  03/01/19  -EM     STG 2  Patient will be independent home exercise program patient will have active range of motion 105 degrees left knee  -EM     STG 2 Progress  Not Met  -EM     STG 3  Patient will have 0 degrees active range of motion left knee extension  -EM     STG 3 Progress  Not Met  -EM     STG 4  Patient was able to ambulate with a cane in 3 weeks  -EM     STG 4 Progress  Met  (Significant)   -EM     STG 5  Patient will be able to straight leg raise without extensor lag  -EM     STG 5 Progress  Not Met  -EM        Long Term Goals    LTG 1  Patient will have 120 degrees active knee flexion  -EM     LTG 1 Progress  Not Met  -EM     LTG 2  Patient will ambulate with nonantalgic gait and no assistive device  -EM     LTG 2 Progress  Not Met  -EM     LTG 3  Patient will have decreased swelling to 38 cm at the medial joint line  -EM     LTG 3 Progress  Not Met  -EM     LTG 4  Patient will have 5/5 manual muscle testing of quads and hamstrings  -EM     LTG 4 Progress  Not Met  -EM     LTG 5  Patient be able to do sit to stands 10 reps without upper extremity support  -EM     LTG 5 Progress  Not Met  -EM        Time Calculation    PT Goal Re-Cert Due Date  03/01/18  -EM       User Key  (r) = Recorded By, (t) = Taken By, (c) = Cosigned By    Initials Name Provider Type    EM Raghu Jewell,  PTA Physical Therapy Assistant          Therapy Education  Education Details: Pt released to [facility] with SC as pt feels comfortable. Pt edu to [facility] with FWRW on slick conditions, painful/stiff days, uneven terrain, long distances.   Given: HEP  Program: Progressed  How Provided: Verbal, Demonstration, Written  Provided to: Patient  Level of Understanding: Verbalized, Demonstrated              Time Calculation:   Start Time: 0757  Stop Time: 0914  Time Calculation (min): 77 min  Total Timed Code Minutes- PT: 77 minute(s)  Therapy Suggested Charges     Code   Minutes Charges    None           Therapy Charges for Today     Code Description Service Date Service Provider Modifiers Qty    32149508271 HC PT THER PROC EA 15 MIN 2/14/2019 Raghu Jewell, PTA GP 3    88557010199 HC GAIT TRAINING EA 15 MIN 2/14/2019 Raghu Jewell, PTA GP 1    42609646158 HC PT ELECTRICAL STIM UNATTENDED 2/14/2019 Raghu Jewell, PTA  1                    Raghu Jewell, PTA  2/14/2019

## 2019-02-15 ENCOUNTER — HOSPITAL ENCOUNTER (OUTPATIENT)
Dept: PHYSICAL THERAPY | Facility: HOSPITAL | Age: 54
Setting detail: THERAPIES SERIES
Discharge: HOME | End: 2019-02-15

## 2019-02-15 DIAGNOSIS — Z96.652 TOTAL KNEE REPLACEMENT STATUS, LEFT: Primary | ICD-10-CM

## 2019-02-15 PROCEDURE — G0283 ELEC STIM OTHER THAN WOUND: HCPCS

## 2019-02-15 PROCEDURE — 97110 THERAPEUTIC EXERCISES: CPT

## 2019-02-15 NOTE — THERAPY TREATMENT NOTE
Outpatient Physical Therapy Ortho Treatment Note  [facility]     Patient Name: Nicholas Negrete  : 1965  MRN: [id number]  Today's Date: 2/15/2019      Visit Date: 02/15/2019   Attendance:  Subjective improvement: 70%  Recert: 65%  MD Appointment: 19      Visit Dx:    ICD-10-CM ICD-9-CM   1. Total knee replacement status, left Z96.652 V43.65       There is no problem list on file for this patient.       Past Medical History:   Diagnosis Date   • Anxiety    • Arthritis    • Back pain    • Fibromyalgia    • GERD (gastroesophageal reflux disease)    • Hemorrhoids    • Hx of heartburn    • Hypertension    • Pain     hip, joint, neck, knee, back    • Problems with hearing    • Sleep apnea    • Sleep apnea     uses Cpap/Bipap         Past Surgical History:   Procedure Laterality Date   • BACK SURGERY      L4 and L5 fused   • BACK SURGERY     • COLONOSCOPY N/A 10/27/2017    Procedure: COLONOSCOPY;  Surgeon: Tai Hayes MD;  Location: [facility] ENDOSCOPY;  Service:        PT Ortho     Row Name 02/15/19 0800       Posture/Observations    Posture/Observations Comments  Pt amb with FWRW, amb throughout tx with SC with step through gt pattern with mild antalgic gt.,   -EM       General ROM    GENERAL ROM COMMENTS  L knee:  3-102 AAROM supine  -EM    Row Name 19 0700       Subjective Pain    Post-Treatment Pain Level  4  -EM       Posture/Observations    Posture/Observations Comments  Pt amb with FWRW, amb throughout tx with SC with step through gt pattern with mild antalgic gt.,   -EM       General ROM    GENERAL ROM COMMENTS  L knee: 2-103 AAROM supine  -EM      User Key  (r) = Recorded By, (t) = Taken By, (c) = Cosigned By    Initials Name Provider Type    EM Raghu Jewell, PTA Physical Therapy Assistant                      PT Assessment/Plan     Row Name 02/15/19 0800          PT Assessment    Assessment Comments  Pt lashawn tx well with progressed therex regime. Pt demo good  "understanding of updated HEP. Pt had slightly reduced ROM this AM.  -EM        PT Plan    PT Frequency  3x/week x 3wks, 2x/wk x 2 wks, 1x/wk x 2 wks  -EM     PT Plan Comments  MD note next tx. Add eccentric Step Down 4\"  -EM       User Key  (r) = Recorded By, (t) = Taken By, (c) = Cosigned By    Initials Name Provider Type    Raghu Estrada, NATHALIE Physical Therapy Assistant          Modalities     Row Name 02/15/19 0800             ELECTRICAL STIMULATION    Attended/Unattended  Unattended  -EM      Stimulation Type  IFC ice/elev  -EM      Max mAmp  24  -EM      Location/Electrode Placement/Other  left knee  -EM        User Key  (r) = Recorded By, (t) = Taken By, (c) = Cosigned By    Initials Name Provider Type    EM Raghu Jewell, NATHALIE Physical Therapy Assistant          Exercises     Row Name 02/15/19 0800             Subjective Comments    Subjective Comments  Pt states he feels more movement and freedom.  Pt reports doing well with SC and with yesterdays tx stating \"It felt better\"  -EM         Subjective Pain    Able to rate subjective pain?  yes  -EM      Pre-Treatment Pain Level  5  -EM      Post-Treatment Pain Level  3  -EM         Exercise 1    Exercise Name 1  PRO II-Seat 9...8...-  -EM      Time 1  10'  -EM         Exercise 2    Exercise Name 2  Incline Calf S  -EM      Sets 2  3  -EM      Time 2  30\"  -EM         Exercise 3    Exercise Name 3  St Ham S  -EM      Sets 3  3  -EM      Time 3  30\"  -EM         Exercise 4    Exercise Name 4  St Knee Flexion S  -EM      Sets 4  3  -EM      Time 4  30\"   -EM         Exercise 5    Exercise Name 5  Fwd Step Up-6\"  -EM      Sets 5  1  -EM      Reps 5  30  -EM         Exercise 6    Exercise Name 6  Lat Step Up-4\"  -EM      Sets 6  1  -EM      Reps 6  30  -EM         Exercise 7    Exercise Name 7  MS  -EM      Sets 7  1  -EM      Reps 7  30  -EM         Exercise 8    Exercise Name 8  Offstep CR  -EM      Sets 8  1  -EM      Reps 8  30  -EM         Exercise 9    " Exercise Name 9  St Ham Curl  -EM      Sets 9  1  -EM      Reps 9  30  -EM         Exercise 10    Exercise Name 10  LAQ  -EM      Sets 10  1  -EM      Reps 10  30  -EM         Exercise 11    Exercise Name 11  Sup SLR   -EM      Sets 11  1  -EM      Reps 11  30  -EM         Exercise 12    Exercise Name 12  Sup VMO SLR   -EM      Reps 12  1x30  -EM         Exercise 13    Exercise Name 13  IFC w/ ice/elev  -EM      Time 13  15'  -EM        User Key  (r) = Recorded By, (t) = Taken By, (c) = Cosigned By    Initials Name Provider Type    EM Raghu Jewell, PTA Physical Therapy Assistant                         PT OP Goals     Row Name 02/15/19 0900 02/15/19 0800       PT Short Term Goals    STG Date to Achieve  03/01/19  -EM  03/01/19  -EM    STG 2  Patient will be independent home exercise program patient will have active range of motion 105 degrees left knee  -EM  Patient will be independent home exercise program patient will have active range of motion 105 degrees left knee  -EM    STG 2 Progress  Not Met  -EM  Not Met  -EM    STG 3  Patient will have 0 degrees active range of motion left knee extension  -EM  Patient will have 0 degrees active range of motion left knee extension  -EM    STG 3 Progress  Not Met  -EM  Not Met  -EM    STG 4  Patient was able to ambulate with a cane in 3 weeks  -EM  Patient was able to ambulate with a cane in 3 weeks  -EM    STG 4 Progress  Met  (Significant)   -EM  Met  (Significant)   -EM    STG 5  Patient will be able to straight leg raise without extensor lag  -EM  Patient will be able to straight leg raise without extensor lag  -EM    STG 5 Progress  Not Met  -EM  Not Met  -EM       Long Term Goals    LTG 1  Patient will have 120 degrees active knee flexion  -EM  Patient will have 120 degrees active knee flexion  -EM    LTG 1 Progress  Not Met  -EM  Not Met  -EM    LTG 2  Patient will ambulate with nonantalgic gait and no assistive device  -EM  Patient will ambulate with nonantalgic  gait and no assistive device  -EM    LTG 2 Progress  Not Met  -EM  Not Met  -EM    LTG 3  Patient will have decreased swelling to 38 cm at the medial joint line  -EM  Patient will have decreased swelling to 38 cm at the medial joint line  -EM    LTG 3 Progress  Not Met  -EM  Not Met  -EM    LTG 4  Patient will have 5/5 manual muscle testing of quads and hamstrings  -EM  Patient will have 5/5 manual muscle testing of quads and hamstrings  -EM    LTG 4 Progress  Not Met  -EM  Not Met  -EM    LTG 5  Patient be able to do sit to stands 10 reps without upper extremity support  -EM  Patient be able to do sit to stands 10 reps without upper extremity support  -EM    LTG 5 Progress  Not Met  -EM  Not Met  -EM       Time Calculation    PT Goal Re-Cert Due Date  03/01/19  -EM  03/01/19  -EM      User Key  (r) = Recorded By, (t) = Taken By, (c) = Cosigned By    Initials Name Provider Type    EM Raghu Jewell PTA Physical Therapy Assistant          Therapy Education  Education Details: Updated HEP issued: St Ham S, St Knee Flex S, HS, QS, SLR, VMO SLR, Fwd/lat Step Up, MS,  St Marches  Given: HEP  Program: Progressed  How Provided: Verbal, Demonstration, Written  Provided to: Patient  Level of Understanding: Verbalized, Demonstrated              Time Calculation:   Start Time: 0801  Stop Time: 0921  Time Calculation (min): 80 min  Total Timed Code Minutes- PT: 80 minute(s)  Therapy Suggested Charges     Code   Minutes Charges    None           Therapy Charges for Today     Code Description Service Date Service Provider Modifiers Qty    91613720413 HC PT THER PROC EA 15 MIN 2/15/2019 Raghu Jewell PTA GP 4    90955864651 HC PT ELECTRICAL STIM UNATTENDED 2/15/2019 Raghu Jewell PTA  1                    Raghu Jewell PTA  2/15/2019

## 2019-02-19 ENCOUNTER — HOSPITAL ENCOUNTER (OUTPATIENT)
Dept: PHYSICAL THERAPY | Facility: HOSPITAL | Age: 54
Setting detail: THERAPIES SERIES
Discharge: HOME | End: 2019-02-19

## 2019-02-19 DIAGNOSIS — M17.12 PRIMARY OSTEOARTHRITIS OF LEFT KNEE: ICD-10-CM

## 2019-02-19 DIAGNOSIS — Z96.652 TOTAL KNEE REPLACEMENT STATUS, LEFT: Primary | ICD-10-CM

## 2019-02-19 PROCEDURE — G0283 ELEC STIM OTHER THAN WOUND: HCPCS | Performed by: PHYSICAL THERAPIST

## 2019-02-19 PROCEDURE — 97110 THERAPEUTIC EXERCISES: CPT | Performed by: PHYSICAL THERAPIST

## 2019-02-19 NOTE — THERAPY TREATMENT NOTE
Outpatient Physical Therapy Ortho Treatment Note  [facility]     Patient Name: Nicholas Negrete  : 1965  MRN: [id number]  Today's Date: 2019      Visit Date: 2019    Visit Dx:    ICD-10-CM ICD-9-CM   1. Total knee replacement status, left Z96.652 V43.65   2. Primary osteoarthritis of left knee M17.12 715.16      , Bhumi mulligan MD 19       Past Medical History:   Diagnosis Date   • Anxiety    • Arthritis    • Back pain    • Fibromyalgia    • GERD (gastroesophageal reflux disease)    • Hemorrhoids    • Hx of heartburn    • Hypertension    • Pain     hip, joint, neck, knee, back    • Problems with hearing    • Sleep apnea    • Sleep apnea     uses Cpap/Bipap         Past Surgical History:   Procedure Laterality Date   • BACK SURGERY      L4 and L5 fused   • BACK SURGERY     • COLONOSCOPY N/A 10/27/2017    Procedure: COLONOSCOPY;  Surgeon: Tai Hayes MD;  Location: [facility] ENDOSCOPY;  Service:        PT Ortho     Row Name 19 1300       General ROM    GENERAL ROM COMMENTS  2-  -DD       MMT (Manual Muscle Testing)    General MMT Comments  good quad set, SLR with lag  -DD       Sensation    Sensation WNL?  WFL  -DD      User Key  (r) = Recorded By, (t) = Taken By, (c) = Cosigned By    Initials Name Provider Type    DD Jayashree Encinas, PT DPT Physical Therapist                PT Assessment/Plan     Row Name 19 1325          PT Assessment    Functional Limitations  Impaired gait;Limitations in community activities;Performance in leisure activities  -DD     Impairments  Pain;Range of motion;Muscle strength;Gait  -DD     Assessment Comments  Continue ROM exercises, Swelling control.  Is exceeding functional expectations for this time frame.  -DD     Rehab Potential  Excellent  -DD     Patient/caregiver participated in establishment of treatment plan and goals  Yes  -DD     Patient would benefit from skilled therapy intervention  Yes  -DD        PT Plan     "PT Frequency  3x/week  -DD     Planned CPT's?  PT EVAL LOW COMPLEXITY: 00865;PT THER PROC EA 15 MIN: 35530;PT GAIT TRAINING EA 15 MIN: 53395;PT ELECTRICAL STIM UNATTEND: ;PT HOT OR COLD PACK TREAT MCARE  -DD     PT Plan Comments  MD tomorrow, continue progression as able.  -DD       User Key  (r) = Recorded By, (t) = Taken By, (c) = Cosigned By    Initials Name Provider Type    Jayashree Metz PT DPT Physical Therapist          Modalities     Row Name 02/19/19 1300             Ice    Ice Applied  Yes  -DD      Location  left knee  -DD      Rx Minutes  15 mins  -DD      Ice S/P Rx  Yes  -DD         ELECTRICAL STIMULATION    Attended/Unattended  Unattended  -DD      Stimulation Type  IFC 15 min  -DD      Max mAmp  25  -DD      Location/Electrode Placement/Other  left knee  -DD        User Key  (r) = Recorded By, (t) = Taken By, (c) = Cosigned By    Initials Name Provider Type    DD Jayashree Encinas, PT DPT Physical Therapist          Exercises     Row Name 02/19/19 1300             Subjective Comments    Subjective Comments  Really stiff  -DD         Subjective Pain    Able to rate subjective pain?  yes  -DD      Pre-Treatment Pain Level  3  -DD         Exercise 1    Exercise Name 1  PRO II-Seat 9...8...-  -DD      Time 1  10'  -DD      Additional Comments  L2  -DD         Exercise 2    Exercise Name 2  Incline Calf S  -DD      Sets 2  3  -DD      Time 2  30\"  -DD         Exercise 3    Exercise Name 3  St Ham S  -DD      Sets 3  3  -DD      Time 3  30\"  -DD         Exercise 4    Exercise Name 4  St Knee Flexion S  -DD      Sets 4  3  -DD      Time 4  30\"   -DD         Exercise 5    Exercise Name 5  Fwd Step Up-6\"  -DD      Sets 5  1  -DD      Reps 5  30  -DD         Exercise 6    Exercise Name 6  Lat Step Up-4\"  -DD      Sets 6  1  -DD      Reps 6  30  -DD         Exercise 7    Exercise Name 7  MS  -DD      Sets 7  1  -DD      Reps 7  30  -DD         Exercise 8    Exercise Name 8  Offstep CR  -DD "      Sets 8  1  -DD      Reps 8  30  -DD         Exercise 9    Exercise Name 9  St Ham Curl  -DD      Sets 9  1  -DD      Reps 9  30  -DD         Exercise 10    Exercise Name 10  stool scoot  -DD      Reps 10  3 laps  -DD         Exercise 11    Exercise Name 11  Sup SLR   -DD      Sets 11  1  -DD      Reps 11  30  -DD         Exercise 12    Exercise Name 12  Sup VMO SLR   -DD      Reps 12  1x30  -DD         Exercise 13    Exercise Name 13  LAQ  -DD         Exercise 14    Exercise Name 14  modalites  -DD        User Key  (r) = Recorded By, (t) = Taken By, (c) = Cosigned By    Initials Name Provider Type    DD Jayashree Encinas, PT DPT Physical Therapist          PT OP Goals     Row Name 02/19/19 1300          PT Short Term Goals    STG 2  Patient will be independent home exercise program patient will have active range of motion 105 degrees left knee  -DD     STG 2 Progress  Progressing  -DD     STG 3  Patient will have 0 degrees active range of motion left knee extension  -DD     STG 3 Progress  Progressing  -DD     STG 4  Patient was able to ambulate with a cane in 3 weeks  -DD     STG 4 Progress  Met  -DD     STG 5  Patient will be able to straight leg raise without extensor lag  -DD     STG 5 Progress  Progressing  -DD        Long Term Goals    LTG 1  Patient will have 120 degrees active knee flexion  -DD     LTG 1 Progress  Not Met  -DD     LTG 2  Patient will ambulate with nonantalgic gait and no assistive device  -DD     LTG 2 Progress  Not Met  -DD     LTG 3  Patient will have decreased swelling to 38 cm at the medial joint line  -DD     LTG 3 Progress  Not Met  -DD     LTG 4  Patient will have 5/5 manual muscle testing of quads and hamstrings  -DD     LTG 4 Progress  Not Met  -DD     LTG 5  Patient be able to do sit to stands 10 reps without upper extremity support  -DD     LTG 5 Progress  Not Met  -DD       User Key  (r) = Recorded By, (t) = Taken By, (c) = Cosigned By    Initials Name Provider Type     DD Jayashree Encinas, PT DPT Physical Therapist        Time Calculation:   Start Time: 1248  Stop Time: 1400  Time Calculation (min): 72 min  Total Timed Code Minutes- PT: 55 minute(s)  Therapy Suggested Charges     Code   Minutes Charges    None           Therapy Charges for Today     Code Description Service Date Service Provider Modifiers Qty    32455770005 HC PT ELECTRICAL STIM UNATTENDED 2/19/2019 Jayashree Encinas, PT DPT  1    91526801056 HC PT THER PROC EA 15 MIN 2/19/2019 Jayashree Encinas, PT DPT GP 4            Jayashree Encinas, PT DPT  2/19/2019

## 2019-02-21 ENCOUNTER — HOSPITAL ENCOUNTER (OUTPATIENT)
Dept: PHYSICAL THERAPY | Facility: HOSPITAL | Age: 54
Setting detail: THERAPIES SERIES
Discharge: HOME | End: 2019-02-21

## 2019-02-21 DIAGNOSIS — Z96.652 TOTAL KNEE REPLACEMENT STATUS, LEFT: Primary | ICD-10-CM

## 2019-02-21 DIAGNOSIS — M17.12 PRIMARY OSTEOARTHRITIS OF LEFT KNEE: ICD-10-CM

## 2019-02-21 PROCEDURE — G0283 ELEC STIM OTHER THAN WOUND: HCPCS

## 2019-02-21 PROCEDURE — 97110 THERAPEUTIC EXERCISES: CPT

## 2019-02-21 NOTE — THERAPY TREATMENT NOTE
Outpatient Physical Therapy Ortho Treatment Note  [facility]     Patient Name: Nicholas Negrete  : 1965  MRN: [id number]  Today's Date: 2019      Visit Date: 2019    Subjective Improvement:    Visits Attended:    Visits Approved 17   MD visit: 19   Recert Date: 3/1/19       Visit Dx:    ICD-10-CM ICD-9-CM   1. Total knee replacement status, left Z96.652 V43.65   2. Primary osteoarthritis of left knee M17.12 715.16       There is no problem list on file for this patient.       Past Medical History:   Diagnosis Date   • Anxiety    • Arthritis    • Back pain    • Fibromyalgia    • GERD (gastroesophageal reflux disease)    • Hemorrhoids    • Hx of heartburn    • Hypertension    • Pain     hip, joint, neck, knee, back    • Problems with hearing    • Sleep apnea    • Sleep apnea     uses Cpap/Bipap         Past Surgical History:   Procedure Laterality Date   • BACK SURGERY      L4 and L5 fused   • BACK SURGERY     • COLONOSCOPY N/A 10/27/2017    Procedure: COLONOSCOPY;  Surgeon: Tai Hayes MD;  Location: [facility] ENDOSCOPY;  Service:        PT Ortho     Row Name 19 1300       General ROM    GENERAL ROM COMMENTS  2-93  -DD       MMT (Manual Muscle Testing)    General MMT Comments  good quad set, SLR with lag  -DD       Sensation    Sensation WNL?  WFL  -DD      User Key  (r) = Recorded By, (t) = Taken By, (c) = Cosigned By    Initials Name Provider Type    Jayashree Metz, PT DPT Physical Therapist                      PT Assessment/Plan     Row Name 19 1000          PT Assessment    Assessment Comments  Pt tolerated tx well with no pain increase noted after tx. Pt reports he will try to ice more often this weekend.  -TW        PT Plan    PT Frequency  3x/week  -TW     PT Plan Comments  Cont per POC.  -TW       User Key  (r) = Recorded By, (t) = Taken By, (c) = Cosigned By    Initials Name Provider Type    TW Raleigh Joe, PTA Physical Therapy  "Assistant          Modalities     Row Name 02/21/19 1000             Ice    Ice Applied  Yes  -TW      Location  Left Knee  -TW      Rx Minutes  15 mins  -TW      Ice S/P Rx  Yes  -TW         ELECTRICAL STIMULATION    Attended/Unattended  Unattended  -TW      Stimulation Type  IFC  -TW      Max mAmp  22  -TW      Location/Electrode Placement/Other  L knee  -TW        User Key  (r) = Recorded By, (t) = Taken By, (c) = Cosigned By    Initials Name Provider Type    TW Raleigh Joe, PTA Physical Therapy Assistant          Exercises     Row Name 02/21/19 1000             Subjective Comments    Subjective Comments  C/O stiffness more than pain.  -TW         Subjective Pain    Able to rate subjective pain?  yes  -TW      Pre-Treatment Pain Level  3 says its stiff.  -TW      Post-Treatment Pain Level  3  -TW         Exercise 1    Exercise Name 1  PRO II-Seat 9...8...-  -TW      Time 1  10'  -TW      Additional Comments  L2  -TW         Exercise 2    Exercise Name 2  Incline Calf S  -TW      Sets 2  3  -TW      Time 2  30''  -TW         Exercise 3    Exercise Name 3  St Ham S  -TW      Sets 3  3  -TW      Time 3  30''  -TW         Exercise 4    Exercise Name 4  St Knee Flexion S  -TW      Sets 4  3  -TW      Time 4  30''  -TW         Exercise 5    Exercise Name 5  Fwd Step Up-6\"  -TW      Sets 5  1  -TW      Reps 5  30  -TW         Exercise 6    Exercise Name 6  Lat Step Up-4\"  -TW      Sets 6  1  -TW      Reps 6  30  -TW         Exercise 7    Exercise Name 7  MS  -TW      Sets 7  1  -TW      Reps 7  30  -TW         Exercise 8    Exercise Name 8  Offstep CR  -TW      Sets 8  1  -TW      Reps 8  30  -TW         Exercise 9    Exercise Name 9  St Ham Curl  -TW      Sets 9  1  -TW      Reps 9  30  -TW         Exercise 10    Exercise Name 10  toe raises  -TW      Sets 10  1  -TW      Reps 10  30  -TW         Exercise 11    Exercise Name 11  Sup SLR   -TW      Sets 11  1  -TW      Reps 11  30  -TW         Exercise 12    " Exercise Name 12  Sup VMO SLR   -TW      Reps 12  1  -TW      Time 12  30  -TW        User Key  (r) = Recorded By, (t) = Taken By, (c) = Cosigned By    Initials Name Provider Type    Raleigh Song PTA Physical Therapy Assistant                         PT OP Goals     Row Name 02/21/19 1106 02/21/19 1000       PT Short Term Goals    STG 2  --  Patient will be independent home exercise program patient will have active range of motion 105 degrees left knee  -TW    STG 2 Progress  --  Progressing  -TW    STG 3  --  Patient will have 0 degrees active range of motion left knee extension  -TW    STG 3 Progress  --  Progressing  -TW    STG 4  --  Patient was able to ambulate with a cane in 3 weeks  -TW    STG 4 Progress  --  Met  (Significant)   -TW    STG 5  --  Patient will be able to straight leg raise without extensor lag  -TW    STG 5 Progress  --  Progressing  -TW       Long Term Goals    LTG 1  --  Patient will have 120 degrees active knee flexion  -TW    LTG 1 Progress  --  Not Met  -TW    LTG 2  --  Patient will ambulate with nonantalgic gait and no assistive device  -TW    LTG 2 Progress  --  Not Met  -TW    LTG 3  --  Patient will have decreased swelling to 38 cm at the medial joint line  -TW    LTG 3 Progress  --  Not Met  -TW    LTG 4  --  Patient will have 5/5 manual muscle testing of quads and hamstrings  -TW    LTG 4 Progress  --  Not Met  -TW    LTG 5  --  Patient be able to do sit to stands 10 reps without upper extremity support  -TW    LTG 5 Progress  --  Not Met  -TW       Time Calculation    PT Goal Re-Cert Due Date  03/01/19  -TW  --      User Key  (r) = Recorded By, (t) = Taken By, (c) = Cosigned By    Initials Name Provider Type    Raleigh Song PTA Physical Therapy Assistant                         Time Calculation:   Start Time: 0958  Stop Time: 1101  Time Calculation (min): 63 min  Total Timed Code Minutes- PT: 63 minute(s)  Therapy Suggested Charges     Code   Minutes Charges     None           Therapy Charges for Today     Code Description Service Date Service Provider Modifiers Qty    62572653462 HC PT THER PROC EA 15 MIN 2/21/2019 Raleigh Joe, PTA GP 3    03625694833 HC PT ELECTRICAL STIM UNATTENDED 2/21/2019 Raleigh Joe, PTA  1    [id number]  PT THER SUPP EA 15 MIN 2/21/2019 Raleigh Joe, NATHALIE GP 1                    Raleigh Joe PTA  2/21/2019

## 2019-02-22 ENCOUNTER — HOSPITAL ENCOUNTER (OUTPATIENT)
Dept: PHYSICAL THERAPY | Facility: HOSPITAL | Age: 54
Setting detail: THERAPIES SERIES
Discharge: HOME | End: 2019-02-22

## 2019-02-22 ENCOUNTER — RESULTS ENCOUNTER (OUTPATIENT)
Dept: BARIATRICS/WEIGHT MGMT | Facility: CLINIC | Age: 54
End: 2019-02-22

## 2019-02-22 DIAGNOSIS — Z96.652 TOTAL KNEE REPLACEMENT STATUS, LEFT: Primary | ICD-10-CM

## 2019-02-22 DIAGNOSIS — E66.01 OBESITY, CLASS III, BMI 40-49.9 (MORBID OBESITY) (HCC): ICD-10-CM

## 2019-02-22 DIAGNOSIS — M17.12 PRIMARY OSTEOARTHRITIS OF LEFT KNEE: ICD-10-CM

## 2019-02-22 PROCEDURE — G0283 ELEC STIM OTHER THAN WOUND: HCPCS | Performed by: PHYSICAL THERAPIST

## 2019-02-22 PROCEDURE — 97110 THERAPEUTIC EXERCISES: CPT | Performed by: PHYSICAL THERAPIST

## 2019-02-22 NOTE — THERAPY TREATMENT NOTE
Outpatient Physical Therapy Ortho Treatment Note  [facility]     Patient Name: Nicholas Negrete  : 1965  MRN: [id number]  Today's Date: 2019      Visit Date: 2019,    MD 19,  iwlliam 3/1  Visit Dx:    ICD-10-CM ICD-9-CM   1. Total knee replacement status, left Z96.652 V43.65   2. Primary osteoarthritis of left knee M17.12 715.16            Past Medical History:   Diagnosis Date   • Anxiety    • Arthritis    • Back pain    • Fibromyalgia    • GERD (gastroesophageal reflux disease)    • Hemorrhoids    • Hx of heartburn    • Hypertension    • Pain     hip, joint, neck, knee, back    • Problems with hearing    • Sleep apnea    • Sleep apnea     uses Cpap/Bipap         Past Surgical History:   Procedure Laterality Date   • BACK SURGERY      L4 and L5 fused   • BACK SURGERY     • COLONOSCOPY N/A 10/27/2017    Procedure: COLONOSCOPY;  Surgeon: Tai Hayes MD;  Location: [facility] ENDOSCOPY;  Service:        PT Ortho     Row Name 19 0700       Subjective Comments    Subjective Comments  MD well pleased, told him to wear his OMAR hose  for the swelling. Swelling is now much better.  -DD       Precautions and Contraindications    Precautions  TKA 19  -DD       Subjective Pain    Able to rate subjective pain?  yes  -DD    Pre-Treatment Pain Level  2  -DD    Post-Treatment Pain Level  1  -DD       Posture/Observations    Posture/Observations Comments  Pt amb with straight cane. slightly decreased ext with heel strike  -DD    Row Name 19 1300       General ROM    GENERAL ROM COMMENTS  2-93  -DD       MMT (Manual Muscle Testing)    General MMT Comments  good quad set, SLR with lag  -DD       Sensation    Sensation WNL?  WFL  -DD      User Key  (r) = Recorded By, (t) = Taken By, (c) = Cosigned By    Initials Name Provider Type    Jayashree Metz, PT DPT Physical Therapist            PT Assessment/Plan     Row Name 19 0757 19 1000       PT  Assessment    Functional Limitations  Impaired gait  -DD  --    Impairments  Pain;Range of motion;Muscle strength;Gait;Endurance  -DD  --    Assessment Comments  Pt is progressing well, MD overly pleased with current progress for 2 weeks. Doing well.  -DD  Pt tolerated tx well with no pain increase noted after tx. Pt reports he will try to ice more often this weekend.  -TW    Please refer to paper survey for additional self-reported information  Yes  -DD  --    Rehab Potential  Excellent  -DD  --    Patient/caregiver participated in establishment of treatment plan and goals  Yes  -DD  --    Patient would benefit from skilled therapy intervention  Yes  -DD  --       PT Plan    PT Frequency  3x/week  -DD  3x/week  -TW    PT Plan Comments  Dontinue progression of stretgthening gait and balance as tolerates. Ice and stim for swelling.  -DD  Cont per POC.  -TW      User Key  (r) = Recorded By, (t) = Taken By, (c) = Cosigned By    Initials Name Provider Type    DD Jayashree Encinas, PT DPT Physical Therapist    TW Raleigh Joe PTA Physical Therapy Assistant          Modalities     Row Name 02/22/19 0748 02/21/19 1000          Ice    Ice Applied  Yes  -DD  Yes  -TW     Location  Left Knee  -DD  Left Knee  -TW     Rx Minutes  15 mins  -DD  15 mins  -TW     Ice S/P Rx  Yes  -DD  Yes  -TW        ELECTRICAL STIMULATION    Attended/Unattended  Unattended  -DD  Unattended  -TW     Stimulation Type  IFC 15 min  -DD  IFC  -TW     Max mAmp  22  -DD  22  -TW     Location/Electrode Placement/Other  L knee  -DD  L knee  -TW       User Key  (r) = Recorded By, (t) = Taken By, (c) = Cosigned By    Initials Name Provider Type    Jayashree Metz, PT DPT Physical Therapist    TW Raleigh Joe PTA Physical Therapy Assistant          Exercises     Row Name 02/22/19 0748 02/22/19 0700 02/21/19 1000       Subjective Comments    Subjective Comments  --  MD well pleased, told him to wear his OMAR hose  for the  "swelling. Swelling is now much better.  -DD  C/O stiffness more than pain.  -TW       Subjective Pain    Able to rate subjective pain?  --  yes  -DD  yes  -TW    Pre-Treatment Pain Level  --  2  -DD  3 says its stiff.  -TW    Post-Treatment Pain Level  --  1  -DD  3  -TW       Exercise 1    Exercise Name 1  PRO II-Seat .8  -DD  --  PRO II-Seat 9...8...-  -TW    Time 1  10'  -DD  --  10'  -TW    Additional Comments  L3  -DD  --  L2  -TW       Exercise 2    Exercise Name 2  Incline Calf S  -DD  --  Incline Calf S  -TW    Sets 2  3  -DD  --  3  -TW    Time 2  30''  -DD  --  30''  -TW       Exercise 3    Exercise Name 3  St Ham S  -DD  --  St Ham S  -TW    Sets 3  3  -DD  --  3  -TW    Time 3  30''  -DD  --  30''  -TW       Exercise 4    Exercise Name 4  St lunge S  -DD  --  St Knee Flexion S  -TW    Sets 4  3  -DD  --  3  -TW    Time 4  30''  -DD  --  30''  -TW       Exercise 5    Exercise Name 5  Fwd Step Up-6\"  -DD  --  Fwd Step Up-6\"  -TW    Sets 5  1  -DD  --  1  -TW    Reps 5  30  -DD  --  30  -TW       Exercise 6    Exercise Name 6  Lat Step Up-4\"  -DD  --  Lat Step Up-4\"  -TW    Sets 6  1  -DD  --  1  -TW    Reps 6  30  -DD  --  30  -TW       Exercise 7    Exercise Name 7  MS  -DD  --  MS  -TW    Sets 7  1  -DD  --  1  -TW    Reps 7  30  -DD  --  30  -TW       Exercise 8    Exercise Name 8  Offstep CR  -DD  --  Offstep CR  -TW    Sets 8  1  -DD  --  1  -TW    Reps 8  30  -DD  --  30  -TW       Exercise 9    Exercise Name 9  St Ham Curl  -DD  --  St Ham Curl  -TW    Sets 9  1  -DD  --  1  -TW    Reps 9  30  -DD  --  30  -TW       Exercise 10    Exercise Name 10  stool scoots  -DD  --  toe raises  -TW    Sets 10  --  --  1  -TW    Reps 10  3 laps  -DD  --  30  -TW       Exercise 11    Exercise Name 11  Sup SLR   -DD  --  Sup SLR   -TW    Sets 11  1  -DD  --  1  -TW    Reps 11  30  -DD  --  30  -TW       Exercise 12    Exercise Name 12  Sup VMO SLR   -DD  --  Sup O SLR   -TW    Reps 12  1  -DD  --  1  -TW    Time " 12  30  -DD  --  30  -TW       Exercise 13    Exercise Name 13  LAQ  -DD  --  --    Reps 13  30  -DD  --  --       Exercise 14    Exercise Name 14  modalities  -DD  --  --      User Key  (r) = Recorded By, (t) = Taken By, (c) = Cosigned By    Initials Name Provider Type    DD Jayashree Encinas, PT DPT Physical Therapist    TW Raleigh Joe, PTA Physical Therapy Assistant              PT OP Goals     Row Name 02/22/19 0748 02/21/19 1106       PT Short Term Goals    STG 2  Patient will be independent home exercise program patient will have active range of motion 105 degrees left knee  -DD  --    STG 2 Progress  Progressing  -DD  --    STG 3  Patient will have 0 degrees active range of motion left knee extension  -DD  --    STG 3 Progress  Progressing  -DD  --    STG 4  Patient was able to ambulate with a cane in 3 weeks  -DD  --    STG 4 Progress  Met  -DD  --    STG 5  Patient will be able to straight leg raise without extensor lag  -DD  --    STG 5 Progress  Progressing  -DD  --       Long Term Goals    LTG 1  Patient will have 120 degrees active knee flexion  -DD  --    LTG 1 Progress  Not Met  -DD  --    LTG 2  Patient will ambulate with nonantalgic gait and no assistive device  -DD  --    LTG 2 Progress  Not Met  -DD  --    LTG 3  Patient will have decreased swelling to 38 cm at the medial joint line  -DD  --    LTG 3 Progress  Not Met  -DD  --    LTG 4  Patient will have 5/5 manual muscle testing of quads and hamstrings  -DD  --    LTG 4 Progress  Not Met  -DD  --    LTG 5  Patient be able to do sit to stands 10 reps without upper extremity support  -DD  --    LTG 5 Progress  Not Met  -DD  --       Time Calculation    PT Goal Re-Cert Due Date  --  03/01/19  -TW    Row Name 02/21/19 1000          PT Short Term Goals    STG 2  Patient will be independent home exercise program patient will have active range of motion 105 degrees left knee  -TW     STG 2 Progress  Progressing  -TW     STG 3  Patient will  have 0 degrees active range of motion left knee extension  -TW     STG 3 Progress  Progressing  -TW     STG 4  Patient was able to ambulate with a cane in 3 weeks  -TW     STG 4 Progress  Met  (Significant)   -TW     STG 5  Patient will be able to straight leg raise without extensor lag  -TW     STG 5 Progress  Progressing  -TW        Long Term Goals    LTG 1  Patient will have 120 degrees active knee flexion  -TW     LTG 1 Progress  Not Met  -TW     LTG 2  Patient will ambulate with nonantalgic gait and no assistive device  -TW     LTG 2 Progress  Not Met  -TW     LTG 3  Patient will have decreased swelling to 38 cm at the medial joint line  -TW     LTG 3 Progress  Not Met  -TW     LTG 4  Patient will have 5/5 manual muscle testing of quads and hamstrings  -TW     LTG 4 Progress  Not Met  -TW     LTG 5  Patient be able to do sit to stands 10 reps without upper extremity support  -TW     LTG 5 Progress  Not Met  -TW       User Key  (r) = Recorded By, (t) = Taken By, (c) = Cosigned By    Initials Name Provider Type    DD Jayashree Encinas PT MAXWELLT Physical Therapist    TW Raleigh Joe, PTA Physical Therapy Assistant            Time Calculation:   Start Time: 0748  Stop Time: 0900  Time Calculation (min): 72 min  Total Timed Code Minutes- PT: 55 minute(s)  Therapy Suggested Charges     Code   Minutes Charges    None           Therapy Charges for Today     Code Description Service Date Service Provider Modifiers Qty    15971980298 HC PT ELECTRICAL STIM UNATTENDED 2/22/2019 Jayashree Encinas PT DPT  1    64646086339 HC PT THER PROC EA 15 MIN 2/22/2019 Jayashree Encinas PT DPT GP 4            Jayashree Encinas PT DPT, ATC  2/22/2019

## 2019-02-25 ENCOUNTER — APPOINTMENT (OUTPATIENT)
Dept: PHYSICAL THERAPY | Facility: HOSPITAL | Age: 54
End: 2019-02-25

## 2019-02-27 ENCOUNTER — HOSPITAL ENCOUNTER (OUTPATIENT)
Dept: PHYSICAL THERAPY | Facility: HOSPITAL | Age: 54
Setting detail: THERAPIES SERIES
Discharge: HOME | End: 2019-02-27

## 2019-02-27 DIAGNOSIS — Z96.652 TOTAL KNEE REPLACEMENT STATUS, LEFT: Primary | ICD-10-CM

## 2019-02-27 PROCEDURE — G0283 ELEC STIM OTHER THAN WOUND: HCPCS

## 2019-02-27 PROCEDURE — 97140 MANUAL THERAPY 1/> REGIONS: CPT

## 2019-02-27 PROCEDURE — 97110 THERAPEUTIC EXERCISES: CPT

## 2019-02-27 NOTE — THERAPY TREATMENT NOTE
Outpatient Physical Therapy Ortho Treatment Note  [facility]     Patient Name: Nicholas Negrete  : 1965  MRN: [id number]  Today's Date: 2019      Visit Date: 2019   Attendance:   Subjective improvement: 70%  Recert: 3/1/19  MD Appointment: TBD        Visit Dx:    ICD-10-CM ICD-9-CM   1. Total knee replacement status, left Z96.652 V43.65       There is no problem list on file for this patient.       Past Medical History:   Diagnosis Date   • Anxiety    • Arthritis    • Back pain    • Fibromyalgia    • GERD (gastroesophageal reflux disease)    • Hemorrhoids    • Hx of heartburn    • Hypertension    • Pain     hip, joint, neck, knee, back    • Problems with hearing    • Sleep apnea    • Sleep apnea     uses Cpap/Bipap         Past Surgical History:   Procedure Laterality Date   • BACK SURGERY      L4 and L5 fused   • BACK SURGERY     • COLONOSCOPY N/A 10/27/2017    Procedure: COLONOSCOPY;  Surgeon: Tai Hayes MD;  Location: [facility] ENDOSCOPY;  Service:        PT Ortho     Row Name 19 08       Precautions and Contraindications    Precautions  TKA 19  -EM       Subjective Pain    Pre-Treatment Pain Level  2  -EM    Post-Treatment Pain Level  1  -EM       Posture/Observations    Posture/Observations Comments  Presents amb with SC. Pt  amb throughout tx with no AD lack full knee ext with gt.  Steristrips removed by PTA, 3 reapplied. Incision good.  Mild edema noted.   -EM       General ROM    GENERAL ROM COMMENTS  3-101 AAROM L knee Supine  -EM      User Key  (r) = Recorded By, (t) = Taken By, (c) = Cosigned By    Initials Name Provider Type    EM Raghu Jewell PTA Physical Therapy Assistant                      PT Assessment/Plan     Row Name 19 0800          PT Assessment    Functional Limitations  Impaired gait  -EM     Impairments  Pain;Range of motion;Muscle strength;Gait;Endurance  -EM     Assessment Comments  Pt lashawn tx well with improved knee  flexion and good lashawn to progressed therex regime.  Pt progressed off AD with good technique and safety.  Pt cont to have knee flexion ROM limitations.  1 goal partially met this tx.   -EM     Rehab Potential  Excellent  -EM     Patient/caregiver participated in establishment of treatment plan and goals  Yes  -EM     Patient would benefit from skilled therapy intervention  Yes  -EM        PT Plan    PT Frequency  3x/week  -EM     PT Plan Comments  Cont to progress as lashawn. Focus on improving knee flexion-cont manual S.   -EM       User Key  (r) = Recorded By, (t) = Taken By, (c) = Cosigned By    Initials Name Provider Type    Raghu Estrada PTA Physical Therapy Assistant          Modalities     Row Name 02/27/19 0800             ELECTRICAL STIMULATION    Attended/Unattended  Unattended  -EM      Stimulation Type  IFC w/ ice/elev  -EM      Max mAmp  24  -EM      Location/Electrode Placement/Other  L knee  -EM        User Key  (r) = Recorded By, (t) = Taken By, (c) = Cosigned By    Initials Name Provider Type    Raghu Estrada PTA Physical Therapy Assistant          Exercises     Row Name 02/27/19 0800             Subjective Comments    Subjective Comments  Pt states he is doing well, eager to come off AD. Pt states he hurt more lastnight-likely from doing more walking yesterday. Pt states he been going some without his SC with no issues. Pt states he had been walking up/down stairs recripically.   -EM         Subjective Pain    Able to rate subjective pain?  yes  -EM      Pre-Treatment Pain Level  2  -EM      Post-Treatment Pain Level  1  -EM         Exercise 1    Exercise Name 1  PRO II-Peak-4.0  -EM      Time 1  10'  -EM         Exercise 2    Exercise Name 2  Incline Calf S  -EM      Sets 2  3  -EM      Time 2  30''  -EM         Exercise 3    Exercise Name 3  St Ham S  -EM      Sets 3  3  -EM      Time 3  30''  -EM         Exercise 4    Exercise Name 4  St Knee Flexion S  -EM      Sets 4  3  -EM      Time 4   "30''  -EM         Exercise 5    Exercise Name 5  Fwd/Lat  Step Up-6\"  -EM      Sets 5  1  -EM      Reps 5  30  -EM         Exercise 6    Exercise Name 6  Eccentric Step Down-6\"  -EM      Sets 6  1  -EM      Reps 6  30  -EM         Exercise 7    Exercise Name 7  Squat on BOSU  -EM      Sets 7  1  -EM      Reps 7  30  -EM         Exercise 8    Exercise Name 8  St 3 Way SLR w/ AW  -EM      Sets 8  1  -EM      Reps 8  30  -EM      Additional Comments  2.5# AW  -EM         Exercise 9    Exercise Name 9  St Marches w/ AW  -EM      Sets 9  1  -EM      Reps 9  30  -EM      Additional Comments  2.5# AW  -EM         Exercise 10    Exercise Name 10  Stool Ham Crawls  -EM      Reps 10  2 Laps  -EM         Exercise 11    Exercise Name 11  Prone Man Knee Flexion S  -EM      Time 11  7'  -EM         Exercise 12    Exercise Name 12  Patellar mobs: Med/Lat, sup/inf  -EM      Time 12  1'  -EM         Exercise 13    Exercise Name 13  IFC w/ ice  -EM      Time 13  15'  -EM        User Key  (r) = Recorded By, (t) = Taken By, (c) = Cosigned By    Initials Name Provider Type    EM Raghu Jewell, PTA Physical Therapy Assistant                        Manual Rx (last 36 hours)      Manual Treatments     Row Name 02/27/19 0900             Manual Rx 1    Manual Rx 1 Location  L knee  -EM      Manual Rx 1 Type  Manual Prone Knee Flexion S  -EM      Manual Rx 1 Duration  7'  -EM         Manual Rx 2    Manual Rx 2 Location  L patella  -EM      Manual Rx 2 Type  Patellar mobs  -EM      Manual Rx 2 Duration  1'  -EM         Manual Rx 3    Manual Rx 3 Location  L knee  -EM      Manual Rx 3 Type  Tibial Glides  -EM      Manual Rx 3 Duration  2'  -EM        User Key  (r) = Recorded By, (t) = Taken By, (c) = Cosigned By    Initials Name Provider Type    EM Raghu Jewell, PTA Physical Therapy Assistant          PT OP Goals     Row Name 02/27/19 0800          PT Short Term Goals    STG 2  Patient will be independent home exercise program patient will " have active range of motion 105 degrees left knee  -EM     STG 2 Progress  Partially Met  -EM     STG 3  Patient will have 0 degrees active range of motion left knee extension  -EM     STG 3 Progress  Progressing  -EM     STG 4  Patient was able to ambulate with a cane in 3 weeks  -EM     STG 4 Progress  Met  (Significant)   -EM     STG 5  Patient will be able to straight leg raise without extensor lag  -EM     STG 5 Progress  Progressing  -EM        Long Term Goals    LTG 1  Patient will have 120 degrees active knee flexion  -EM     LTG 1 Progress  Not Met  -EM     LTG 2  Patient will ambulate with nonantalgic gait and no assistive device  -EM     LTG 2 Progress  Not Met  -EM     LTG 3  Patient will have decreased swelling to 38 cm at the medial joint line  -EM     LTG 3 Progress  Not Met  -EM     LTG 4  Patient will have 5/5 manual muscle testing of quads and hamstrings  -EM     LTG 4 Progress  Not Met  -EM     LTG 5  Patient be able to do sit to stands 10 reps without upper extremity support  -EM     LTG 5 Progress  Not Met  -EM        Time Calculation    PT Goal Re-Cert Due Date  03/01/19  -EM       User Key  (r) = Recorded By, (t) = Taken By, (c) = Cosigned By    Initials Name Provider Type    EM Raghu Jewell, PTA Physical Therapy Assistant          Therapy Education  Education Details: Pt released to amb without AD as pt feels comfortable. Pt edu to regress to SC on slick conditions, uneven terrain, long distances, painful/stiff days.   Given: HEP  Program: Progressed  How Provided: Verbal, Demonstration, Written  Provided to: Patient  Level of Understanding: Verbalized, Demonstrated              Time Calculation:   Start Time: 0759  Stop Time: 0936  Time Calculation (min): 97 min  Total Timed Code Minutes- PT: 97 minute(s)  Therapy Suggested Charges     Code   Minutes Charges    None           Therapy Charges for Today     Code Description Service Date Service Provider Modifiers Qty    29992575919 HC PT  THER PROC EA 15 MIN 2/27/2019 Raghu Jewell, PTA GP 4    07648991219 HC PT MANUAL THERAPY EA 15 MIN 2/27/2019 Raghu Jewell, PTA GP 1    11622247118 HC PT ELECTRICAL STIM UNATTENDED 2/27/2019 Raghu Jewell, PTA  1                    Raghu Jewell, PTA  2/27/2019

## 2019-02-28 ENCOUNTER — HOSPITAL ENCOUNTER (OUTPATIENT)
Dept: PHYSICAL THERAPY | Facility: HOSPITAL | Age: 54
Setting detail: THERAPIES SERIES
Discharge: HOME | End: 2019-02-28

## 2019-02-28 DIAGNOSIS — Z96.652 TOTAL KNEE REPLACEMENT STATUS, LEFT: Primary | ICD-10-CM

## 2019-02-28 PROCEDURE — 97140 MANUAL THERAPY 1/> REGIONS: CPT

## 2019-02-28 PROCEDURE — 97110 THERAPEUTIC EXERCISES: CPT

## 2019-02-28 PROCEDURE — G0283 ELEC STIM OTHER THAN WOUND: HCPCS

## 2019-03-01 ENCOUNTER — HOSPITAL ENCOUNTER (OUTPATIENT)
Dept: PHYSICAL THERAPY | Facility: HOSPITAL | Age: 54
Setting detail: THERAPIES SERIES
Discharge: HOME | End: 2019-03-01

## 2019-03-01 DIAGNOSIS — M17.12 PRIMARY OSTEOARTHRITIS OF LEFT KNEE: ICD-10-CM

## 2019-03-01 DIAGNOSIS — Z96.652 TOTAL KNEE REPLACEMENT STATUS, LEFT: Primary | ICD-10-CM

## 2019-03-01 PROCEDURE — 97110 THERAPEUTIC EXERCISES: CPT | Performed by: PHYSICAL THERAPIST

## 2019-03-01 PROCEDURE — G0283 ELEC STIM OTHER THAN WOUND: HCPCS | Performed by: PHYSICAL THERAPIST

## 2019-03-01 NOTE — THERAPY PROGRESS REPORT/RE-CERT
Outpatient Physical Therapy Ortho Progress Note  [facility]     Patient Name: Nicholas Negrete  : 1965  MRN: [id number]  Today's Date: 3/1/2019      Visit Date: 2019      Attendance: 10/10  Subjective improvement: 80%  Recert: 3/21/19  MD Appointment: 3/25/19         Visit Dx:    ICD-10-CM ICD-9-CM   1. Total knee replacement status, left Z96.652 V43.65   2. Primary osteoarthritis of left knee M17.12 715.16            Past Medical History:   Diagnosis Date   • Anxiety    • Arthritis    • Back pain    • Fibromyalgia    • GERD (gastroesophageal reflux disease)    • Hemorrhoids    • Hx of heartburn    • Hypertension    • Pain     hip, joint, neck, knee, back    • Problems with hearing    • Sleep apnea    • Sleep apnea     uses Cpap/Bipap         Past Surgical History:   Procedure Laterality Date   • BACK SURGERY      L4 and L5 fused   • BACK SURGERY     • COLONOSCOPY N/A 10/27/2017    Procedure: COLONOSCOPY;  Surgeon: Tai Hayes MD;  Location: [facility] ENDOSCOPY;  Service:        PT Ortho     Row Name 19 0800       Subjective Comments    Subjective Comments  Really sore from adhesions in scar popping during PT yesterday  -DD       Precautions and Contraindications    Precautions  TKA 19  -DD       Subjective Pain    Able to rate subjective pain?  yes  -DD    Pre-Treatment Pain Level  3  -DD    Post-Treatment Pain Level  1  -DD       Posture/Observations    Posture/Observations Comments  No AD. 3 steristrips remain, No SOI. Lacks TKE during heel strike  -DD       General ROM    GENERAL ROM COMMENTS  -0-107 AAROM, 100 active.   -DD       MMT (Manual Muscle Testing)    General MMT Comments  good quad set, 5 deg lag with SLR  -DD       Sensation    Sensation WNL?  WFL  -DD       Girth    Girth Measured?  Left Lower Extremity 37.0 cm at joint line  -DD    Row Name 19 1500       Subjective Comments    Subjective Comments  Pt reports he is doing well. No problems   -EM        Precautions and Contraindications    Precautions  TKA 2/4/19  -EM       Posture/Observations    Posture/Observations Comments  Amb Ind with mild antalgic gt due to lacking TKE. Incision good, 3 steristrips still intact.   Multiple scar adhesions broke loose with man knee flexion S with improved ROM afterwards.  Firm endfill at endrange of flexion.   -EM       General ROM    GENERAL ROM COMMENTS  0-107 AAROM supine (heelprop with ext)  -EM    Row Name 02/27/19 0800       Precautions and Contraindications    Precautions  TKA 2/4/19  -EM       Subjective Pain    Pre-Treatment Pain Level  2  -EM    Post-Treatment Pain Level  1  -EM       Posture/Observations    Posture/Observations Comments  Presents amb with SC. Pt  amb throughout tx with no AD lack full knee ext with gt.  Steristrips removed by PTA, 3 reapplied. Incision good.  Mild edema noted.   -EM       General ROM    GENERAL ROM COMMENTS  3-101 AAROM L knee Supine  -EM      User Key  (r) = Recorded By, (t) = Taken By, (c) = Cosigned By    Initials Name Provider Type    EM Raghu Jewell, PTA Physical Therapy Assistant    Jayashree Metz, PT DPT Physical Therapist                      PT Assessment/Plan     Row Name 02/28/19 1500          PT Assessment    Functional Limitations  Impaired gait  -EM     Impairments  Pain;Range of motion;Muscle strength;Gait;Endurance  -EM     Assessment Comments  Pt lashawn tx well. Scar adhesions break loose with manual stretching thus improving knee flexion ROM.   -EM     Rehab Potential  Excellent  -EM     Patient/caregiver participated in establishment of treatment plan and goals  Yes  -EM     Patient would benefit from skilled therapy intervention  Yes  -EM        PT Plan    PT Frequency  3x/week  -EM     PT Plan Comments  T/f to [facility] after next wk. Add sit-stands next tx.  Focus on improving knee flexion.   (Significant)   -EM       User Key  (r) = Recorded By, (t) = Taken By, (c) = Cosigned By    Initials  "Name Provider Type    EM Raghu Jewell, PTA Physical Therapy Assistant          Modalities     Row Name 03/01/19 0750             Ice    Location  left knee  -DD      Rx Minutes  15 mins  -DD      Ice S/P Rx  Yes  -DD         ELECTRICAL STIMULATION    Attended/Unattended  Unattended  -DD      Stimulation Type  IFC 15 min  -DD      Max mAmp  25  -DD      Location/Electrode Placement/Other  left knee  -DD        User Key  (r) = Recorded By, (t) = Taken By, (c) = Cosigned By    Initials Name Provider Type    DD Jayashree Encinas, PT DPT Physical Therapist          Exercises     Row Name 03/01/19 0800 03/01/19 0750 02/28/19 1500       Subjective Comments    Subjective Comments  Really sore from adhesions in scar popping during PT yesterday  -DD  --  Pt reports he is doing well. No problems   -EM       Subjective Pain    Able to rate subjective pain?  yes  -DD  --  yes  -EM    Pre-Treatment Pain Level  3  -DD  --  2  -EM    Post-Treatment Pain Level  1  -DD  --  1  -EM       Exercise 1    Exercise Name 1  --  PRO II-Peak-5.0  -DD  PRO II-Peak-5.0  -EM    Time 1  --  10'  -DD  10'  -EM       Exercise 2    Exercise Name 2  --  Incline Calf S  -DD  Incline Calf S  -EM    Sets 2  --  3  -DD  3  -EM    Time 2  --  30''  -DD  30''  -EM       Exercise 3    Exercise Name 3  --  St Ham S  -DD  St Ham S  -EM    Sets 3  --  3  -DD  3  -EM    Time 3  --  30''  -DD  30''  -EM       Exercise 4    Exercise Name 4  --  St Knee Flexion S  -DD  St Knee Flexion S  -EM    Sets 4  --  3  -DD  3  -EM    Time 4  --  30\"  -DD  30\"  -EM       Exercise 5    Exercise Name 5  --  Fwd/Lat  Step Up-6\"  -DD  Fwd/Lat  Step Up-6\"  -EM    Sets 5  --  1  -DD  1  -EM    Reps 5  --  30  -DD  30  -EM       Exercise 6    Exercise Name 6  --  Eccentric Step Down-6\"  -DD  Eccentric Step Down-6\"  -EM    Sets 6  --  1  -DD  1  -EM    Reps 6  --  30  -DD  30  -EM       Exercise 7    Exercise Name 7  --  Squat on Rev  BOSU  -DD  Squat on Rev  BOSU  -EMMA    " "Sets 7  --  1  -DD  1  -EM    Reps 7  --  30  -DD  30  -EM       Exercise 8    Exercise Name 8  --  Prone Man knee flex  -DD  Prone Man knee flex  -EM    Time 8  --  8'  -DD  8'  -EM       Exercise 9    Exercise Name 9  --  Longsitting QS w/ heelprop  -DD  Longsitting QS w/ heelprop  -EM    Sets 9  --  1  -DD  1  -EM    Reps 9  --  30  -DD  30  -EM    Time 9  --  10\" hold  -DD  10\" hold  -EM       Exercise 10    Exercise Name 10  --  Longsitting  SLR \"T\"  -DD  Longsitting  SLR \"T\"  -EM    Sets 10  --  1  -DD  1  -EM    Reps 10  --  30  -DD  30  -EM       Exercise 11    Exercise Name 11  --  stool scoot  -DD  IFC w/ ice/elev  -EM    Reps 11  --  2 laps  -DD  --    Time 11  --  --  15'  -EM       Exercise 12    Exercise Name 12  --  modalities  -DD  --      User Key  (r) = Recorded By, (t) = Taken By, (c) = Cosigned By    Initials Name Provider Type    EM Raghu Jewell, NATHALIE Physical Therapy Assistant    Jayashree Metz, PT DPT Physical Therapist                        Manual Rx (last 36 hours)      Manual Treatments     Row Name 02/28/19 1500             Manual Rx 1    Manual Rx 1 Location  L knee  -EM      Manual Rx 1 Type  Manual Prone Knee Flexion S  -EM      Manual Rx 1 Grade  Grade 1 oscillations at endrange  -EM      Manual Rx 1 Duration  8'  -EM        User Key  (r) = Recorded By, (t) = Taken By, (c) = Cosigned By    Initials Name Provider Type    EM Raghu Jewell PTA Physical Therapy Assistant          PT OP Goals     Row Name 03/01/19 0750 02/28/19 1500       PT Short Term Goals    STG Date to Achieve  03/01/19  -DD  --    STG 1  Pt will have LEFS of 40/80  -DD  --    STG 1 Progress  Met  -DD  --    STG 2  Patient will be independent home exercise program.  -DD  Patient will be independent home exercise program patient will have active range of motion 105 degrees left knee  -EM    STG 2 Progress  Met  -DD  Partially Met  -EM    STG 3  Patient will have 0 degrees active range of motion left knee " extension  -DD  Patient will have 0 degrees active range of motion left knee extension  -EM    STG 3 Progress  Met  -DD  Met  (Significant)   -EM    STG 4  Patient was able to ambulate with a cane in 3 weeks  -DD  Patient was able to ambulate with a cane in 3 weeks  -EM    STG 4 Progress  Met  -DD  Met  (Significant)   -EM    STG 5  Patient will be able to straight leg raise without extensor lag  -DD  Patient will be able to straight leg raise without extensor lag  -EM    STG 5 Progress  Progressing  -DD  Progressing  -EM    STG 6  Pt will have left knee flexion of 105  -DD  --    STG 6 Progress  Met  -DD  --       Long Term Goals    LTG 1  Patient will have 120 degrees active knee flexion  -DD  Patient will have 120 degrees active knee flexion  -EM    LTG 1 Progress  Not Met  -DD  Not Met  -EM    LTG 2  Patient will ambulate with nonantalgic gait and no assistive device  -DD  Patient will ambulate with nonantalgic gait and no assistive device  -EM    LTG 2 Progress  Not Met  -DD  Not Met  -EM    LTG 3  Patient will have decreased swelling to 38 cm at the medial joint line  -DD  Patient will have decreased swelling to 38 cm at the medial joint line  -EM    LTG 3 Progress  Met  -DD  Not Met  -EM    LTG 4  Patient will have 5/5 manual muscle testing of quads and hamstrings  -DD  Patient will have 5/5 manual muscle testing of quads and hamstrings  -EM    LTG 4 Progress  Not Met  -DD  Not Met  -EM    LTG 5  Patient be able to do sit to stands 10 reps without upper extremity support  -DD  Patient be able to do sit to stands 10 reps without upper extremity support  -EM    LTG 5 Progress  Met  -DD  Not Met  -EM       Time Calculation    PT Goal Re-Cert Due Date  03/21/19  -DD  03/01/19  -EM      User Key  (r) = Recorded By, (t) = Taken By, (c) = Cosigned By    Initials Name Provider Type    EM Raghu Jewell, PTA Physical Therapy Assistant    Jayashree Metz, PT DPT Physical Therapist               Outcome  Measure Options: Lower Extremity Functional Scale (LEFS)  Lower Extremity Functional Index  Any of your usual work, housework or school activities: A little bit of difficulty  Your usual hobbies, recreational or sporting activities: Moderate difficulty  Getting into or out of the bath: A little bit of difficulty  Walking between rooms: A little bit of difficulty  Putting on your shoes or socks: A little bit of difficulty  Squatting: Moderate difficulty  Lifting an object, like a bag of groceries from the floor: A little bit of difficulty  Performing light activities around your home: No difficulty  Performing heavy activities around your home: Moderate difficulty  Getting into or out of a car: A little bit of difficulty  Walking 2 blocks: A little bit of difficulty  Walking a mile: Moderate difficulty  Going up or down 10 stairs (about 1 flight of stairs): A little bit of difficulty  Standing for 1 hour: A little bit of difficulty  Sitting for 1 hour: Moderate difficulty  Running on even ground: Extreme difficulty or unable to perform activity  Running on uneven ground: Extreme difficulty or unable to perform activity  Making sharp turns while running fast: Extreme difficulty or unable to perform activity  Hopping: Extreme difficulty or unable to perform activity  Rolling over in bed: A little bit of difficulty  Total: 44      Time Calculation:   Start Time: 0750  Stop Time: 0853  Time Calculation (min): 63 min  Total Timed Code Minutes- PT: 45 minute(s)  Therapy Suggested Charges     Code   Minutes Charges    None           Therapy Charges for Today     Code Description Service Date Service Provider Modifiers Qty    90204482862 HC PT ELECTRICAL STIM UNATTENDED 3/1/2019 Jayashree Encinas PT DPT  1    18400079515 HC PT THER PROC EA 15 MIN 3/1/2019 Jayashree Encinas PT DPT GP 3          PT G-Codes  Outcome Measure Options: Lower Extremity Functional Scale (LEFS)  Total: 44         Jayashree Encinas  PT DPT, ATC  3/1/2019

## 2019-03-04 ENCOUNTER — HOSPITAL ENCOUNTER (OUTPATIENT)
Dept: PHYSICAL THERAPY | Facility: HOSPITAL | Age: 54
Setting detail: THERAPIES SERIES
Discharge: HOME | End: 2019-03-04

## 2019-03-04 DIAGNOSIS — Z96.652 TOTAL KNEE REPLACEMENT STATUS, LEFT: Primary | ICD-10-CM

## 2019-03-04 PROCEDURE — G0283 ELEC STIM OTHER THAN WOUND: HCPCS

## 2019-03-04 PROCEDURE — 97110 THERAPEUTIC EXERCISES: CPT

## 2019-03-04 NOTE — PROGRESS NOTES
Outpatient Physical Therapy Ortho Treatment Note   [facility]     Patient Name: Nicholas Negrete  : 1965  MRN: [id number]  Today's Date: 3/4/2019      Visit Date: 2019    Subjective Improvement:    90%   Attendance:   Approved:   17 visits        MD follow up:    3-25-19        date:     3-21-19    Visit Dx:    ICD-10-CM ICD-9-CM   1. Total knee replacement status, left Z96.652 V43.65       There is no problem list on file for this patient.       Past Medical History:   Diagnosis Date   • Anxiety    • Arthritis    • Back pain    • Fibromyalgia    • GERD (gastroesophageal reflux disease)    • Hemorrhoids    • Hx of heartburn    • Hypertension    • Pain     hip, joint, neck, knee, back    • Problems with hearing    • Sleep apnea    • Sleep apnea     uses Cpap/Bipap         Past Surgical History:   Procedure Laterality Date   • BACK SURGERY      L4 and L5 fused   • BACK SURGERY     • COLONOSCOPY N/A 10/27/2017    Procedure: COLONOSCOPY;  Surgeon: Tai Hayes MD;  Location: [facility] ENDOSCOPY;  Service:        PT Ortho     Row Name 19 0800       Subjective Comments    Subjective Comments  Reports just a little stiffness.  Overall doing very well.  -TM       Precautions and Contraindications    Precautions  TKA 19  -TM       Subjective Pain    Able to rate subjective pain?  yes  -TM    Pre-Treatment Pain Level  1  -TM       Posture/Observations    Posture/Observations Comments  amb without AD  -TM       General ROM    GENERAL ROM COMMENTS  0-108°  -TM       MMT (Manual Muscle Testing)    General MMT Comments  5° quad lag  -TM      User Key  (r) = Recorded By, (t) = Taken By, (c) = Cosigned By    Initials Name Provider Type    Magdalena Cardoza, PTA Physical Therapy Assistant                      PT Assessment/Plan     Row Name 19 0800          PT Assessment    Assessment Comments  Pt conts with mild lag during SLR.  Good eccentric control step downs.    "-TM     Patient/caregiver participated in establishment of treatment plan and goals  Yes  -TM        PT Plan    PT Frequency  3x/week  -TM     PT Plan Comments  CC 4 way resisted gait  -TM       User Key  (r) = Recorded By, (t) = Taken By, (c) = Cosigned By    Initials Name Provider Type    TM Magdalena Malloy PTA Physical Therapy Assistant          Modalities     Row Name 03/04/19 0800             Ice    Ice Applied  Yes  -TM      Location  L knee  -TM      Rx Minutes  15 mins  -TM      Ice S/P Rx  Yes  -TM         ELECTRICAL STIMULATION    Attended/Unattended  Unattended  -TM      Stimulation Type  IFC  -TM      Max mAmp  30  -TM      Location/Electrode Placement/Other  L knee  -TM        User Key  (r) = Recorded By, (t) = Taken By, (c) = Cosigned By    Initials Name Provider Type    TM Magdalena Malloy PTA Physical Therapy Assistant          Exercises     Row Name 03/04/19 0800             Subjective Comments    Subjective Comments  Reports just a little stiffness.  Overall doing very well.  -TM         Subjective Pain    Able to rate subjective pain?  yes  -TM      Pre-Treatment Pain Level  1  -TM         Exercise 1    Exercise Name 1  PRO II twin peaks  -TM      Time 1  10 min  -TM      Additional Comments  L 5  -TM         Exercise 2    Exercise Name 2  Incline Calf S  -TM      Sets 2  3  -TM      Time 2  30\"  -TM         Exercise 3    Exercise Name 3  standing HS stretch  -TM      Sets 3  3  -TM      Time 3  30\"  -TM         Exercise 4    Exercise Name 4  standing knee flexion stretch  -TM      Sets 4  3  -TM      Time 4  30\"  -TM         Exercise 5    Exercise Name 5  longsitting SLR \"T\"  -TM      Sets 5  1  -TM      Reps 5  30  -TM         Exercise 6    Exercise Name 6  step up and over fwd  -TM      Sets 6  1  -TM      Reps 6  30  -TM      Additional Comments  6\" step  -TM         Exercise 7    Exercise Name 7  lateral step ups  -TM      Sets 7  1  -TM      Reps 7  30  -TM      Additional Comments  " "6\"  -TM         Exercise 8    Exercise Name 8  CC TKE  -TM      Sets 8  1  -TM      Reps 8  30  -TM      Additional Comments  3 plates  -TM         Exercise 9    Exercise Name 9  inverted BOSU squats  -TM      Sets 9  1  -TM      Reps 9  30  -TM         Exercise 10    Exercise Name 10  AA heelslides  -TM      Reps 10  10  -TM        User Key  (r) = Recorded By, (t) = Taken By, (c) = Cosigned By    Initials Name Provider Type     Magdalena Malloy PTA Physical Therapy Assistant                         PT OP Goals     Row Name 03/04/19 0800          PT Short Term Goals    STG Date to Achieve  03/01/19  -TM     STG 1  Pt will have LEFS of 40/80  -TM     STG 1 Progress  Met  -TM     STG 2  Patient will be independent home exercise program.  -TM     STG 2 Progress  Met  -TM     STG 3  Patient will have 0 degrees active range of motion left knee extension  -TM     STG 3 Progress  Met  -TM     STG 4  Patient was able to ambulate with a cane in 3 weeks  -     STG 4 Progress  Met  -TM     STG 5  Patient will be able to straight leg raise without extensor lag  -TM     STG 5 Progress  Progressing  -TM     STG 6  Pt will have left knee flexion of 105  -TM     STG 6 Progress  Met  -TM        Long Term Goals    LTG 1  Patient will have 120 degrees active knee flexion  -TM     LTG 1 Progress  Not Met  -TM     LTG 2  Patient will ambulate with nonantalgic gait and no assistive device  -TM     LTG 2 Progress  Not Met  -TM     LTG 3  Patient will have decreased swelling to 38 cm at the medial joint line  -TM     LTG 3 Progress  Met  -TM     LTG 4  Patient will have 5/5 manual muscle testing of quads and hamstrings  -TM     LTG 4 Progress  Not Met  -TM     LTG 5  Patient be able to do sit to stands 10 reps without upper extremity support  -     LTG 5 Progress  Met  -TM       User Key  (r) = Recorded By, (t) = Taken By, (c) = Cosigned By    Initials Name Provider Type     Magdalena Malloy PTA Physical Therapy Assistant "                         Time Calculation:   Start Time: 0759  Stop Time: 0903  Time Calculation (min): 64 min  Total Timed Code Minutes- PT: 49 minute(s)  Therapy Suggested Charges     Code   Minutes Charges    None           Therapy Charges for Today     Code Description Service Date Service Provider Modifiers Qty    09533981854 HC PT THER PROC EA 15 MIN 3/4/2019 Magdalena Malloy, PTA GP 3    13238185485 HC PT ELECTRICAL STIM UNATTENDED 3/4/2019 Magdalena Malloy, PTA  1                    Magdalena Malloy, NATHALIE  3/4/2019

## 2019-03-07 ENCOUNTER — HOSPITAL ENCOUNTER (OUTPATIENT)
Dept: PHYSICAL THERAPY | Facility: HOSPITAL | Age: 54
Setting detail: THERAPIES SERIES
Discharge: HOME | End: 2019-03-07

## 2019-03-07 DIAGNOSIS — M17.12 PRIMARY OSTEOARTHRITIS OF LEFT KNEE: ICD-10-CM

## 2019-03-07 DIAGNOSIS — Z96.652 TOTAL KNEE REPLACEMENT STATUS, LEFT: Primary | ICD-10-CM

## 2019-03-07 PROCEDURE — G0283 ELEC STIM OTHER THAN WOUND: HCPCS

## 2019-03-07 PROCEDURE — 97110 THERAPEUTIC EXERCISES: CPT

## 2019-03-07 NOTE — PROGRESS NOTES
Outpatient Physical Therapy Ortho Treatment Note   Jama Bravo     Patient Name: Nicholas Negrete  : 1965  MRN: [id number]  Today's Date: 3/7/2019      Visit Date: 2019    Subjective Improvement:     90%  Attendance:   Approved:    17 visits        MD follow up:     3-25-19       date:     3-21-19    Visit Dx:    ICD-10-CM ICD-9-CM   1. Total knee replacement status, left Z96.652 V43.65   2. Primary osteoarthritis of left knee M17.12 715.16       There is no problem list on file for this patient.       Past Medical History:   Diagnosis Date   • Anxiety    • Arthritis    • Back pain    • Fibromyalgia    • GERD (gastroesophageal reflux disease)    • Hemorrhoids    • Hx of heartburn    • Hypertension    • Pain     hip, joint, neck, knee, back    • Problems with hearing    • Sleep apnea    • Sleep apnea     uses Cpap/Bipap         Past Surgical History:   Procedure Laterality Date   • BACK SURGERY      L4 and L5 fused   • BACK SURGERY     • COLONOSCOPY N/A 10/27/2017    Procedure: COLONOSCOPY;  Surgeon: Tai Hayes MD;  Location: [facility] ENDOSCOPY;  Service:        PT Ortho     Row Name 19 0800       Precautions and Contraindications    Precautions  --  -TM       Posture/Observations    Posture/Observations Comments  --  -TM    Row Name 19 0736       Subjective Comments    Subjective Comments  Reports getting good stretch on  home gym yesterday.  [place]  like scar tissue was breaking loose.  -TM       Precautions and Contraindications    Precautions  TKA 19  -TM       Subjective Pain    Pre-Treatment Pain Level  1  -TM       Posture/Observations    Posture/Observations Comments  amb without AD  -TM       General ROM    GENERAL ROM COMMENTS  0-113°  -TM      User Key  (r) = Recorded By, (t) = Taken By, (c) = Cosigned By    Initials Name Provider Type    TM Magdalena Malloy, PTA Physical Therapy Assistant                      PT Assessment/Plan     Row Name  "03/07/19 0800          PT Assessment    Assessment Comments  Pt shows improved gait pattern.  Gained ROM knee flexion.    -TM     Patient/caregiver participated in establishment of treatment plan and goals  Yes  -TM        PT Plan    PT Frequency  3x/week  -TM     PT Plan Comments  CC 4 way, patella mobes  -TM       User Key  (r) = Recorded By, (t) = Taken By, (c) = Cosigned By    Initials Name Provider Type     Magdalena Malloy, NATHALIE Physical Therapy Assistant          Modalities     Row Name 03/07/19 0800             Ice    Ice Applied  Yes  -TM      Location  L knee  -TM      Rx Minutes  15 mins  -TM      Ice S/P Rx  Yes  -TM         ELECTRICAL STIMULATION    Attended/Unattended  Unattended  -TM      Stimulation Type  IFC  -TM      Max mAmp  30  -TM      Location/Electrode Placement/Other  L knee  -TM        User Key  (r) = Recorded By, (t) = Taken By, (c) = Cosigned By    Initials Name Provider Type     Magdalena Malloy PTA Physical Therapy Assistant          Exercises     Row Name 03/07/19 0800 03/07/19 0736          Subjective Comments    Subjective Comments  --  Reports getting good stretch on  home gym yesterday.  [place]  like scar tissue was breaking loose.  -TM        Subjective Pain    Able to rate subjective pain?  --  -TM  yes  -TM     Pre-Treatment Pain Level  --  1  -TM        Exercise 1    Exercise Name 1  --  -TM  PRO II twin peaks  -DD     Time 1  --  -TM  10 min  -DD     Additional Comments  --  -TM  L5  -DD        Exercise 2    Exercise Name 2  --  -TM  Incline Calf S  -DD     Sets 2  --  -TM  3  -DD     Time 2  --  -TM  30\"  -DD        Exercise 3    Exercise Name 3  --  -TM  seated HS stretch  -DD     Sets 3  --  -TM  3  -DD     Time 3  --  -TM  30\"  -DD        Exercise 4    Exercise Name 4  --  -TM  standing lunge stretch  -DD     Sets 4  --  -TM  3  -DD     Time 4  --  -TM  30\"  -DD        Exercise 5    Exercise Name 5  --  -TM  longsitting SLR \"T\"  -DD     Sets 5  --  -TM  1  -DD     " "Reps 5  --  -TM  30  -DD        Exercise 6    Exercise Name 6  --  step up and over fwd  -DD     Sets 6  --  1  -DD     Reps 6  --  30  -DD     Additional Comments  --  6\"  -DD        Exercise 7    Exercise Name 7  --  lateral step ups  -DD     Sets 7  --  1  -DD     Reps 7  --  30  -DD     Additional Comments  --  6\"  -DD        Exercise 8    Exercise Name 8  --  CC TKE  -DD     Sets 8  --  1  -DD     Reps 8  --  30  -DD     Additional Comments  --  4 pl  -DD        Exercise 9    Exercise Name 9  --  LP  -DD     Sets 9  --  2  -DD     Reps 9  --  15  -DD     Additional Comments  --  100#  -DD        Exercise 10    Exercise Name 10  --  AA heelslides  -DD     Reps 10  --  10  -DD        Exercise 11    Exercise Name 11  --  CC walkouts fwd/bkd  -DD     Reps 11  --  5  -DD     Additional Comments  --  3 pl  -DD        Exercise 12    Exercise Name 12  --  inverted BOSU squats  -TM     Reps 12  --  30  -TM       User Key  (r) = Recorded By, (t) = Taken By, (c) = Cosigned By    Initials Name Provider Type    TM Magdalena Malloy, PTA Physical Therapy Assistant    DD Jayashree Encinas, PT DPT Physical Therapist                         PT OP Goals     Row Name 03/07/19 0736          PT Short Term Goals    STG Date to Achieve  03/01/19  -DD     STG 1  Pt will have LEFS of 40/80  -DD     STG 1 Progress  Met  -DD     STG 2  Patient will be independent home exercise program.  -DD     STG 2 Progress  Met  -DD     STG 3  Patient will have 0 degrees active range of motion left knee extension  -DD     STG 3 Progress  Met  -DD     STG 4  Patient was able to ambulate with a cane in 3 weeks  -DD     STG 4 Progress  Met  -DD     STG 5  Patient will be able to straight leg raise without extensor lag  -DD     STG 5 Progress  Progressing  -DD     STG 6  Pt will have left knee flexion of 105  -DD     STG 6 Progress  Met  -DD        Long Term Goals    LTG 1  Patient will have 120 degrees active knee flexion  -DD     LTG 1 Progress  " Not Met  -DD     LTG 2  Patient will ambulate with nonantalgic gait and no assistive device  -DD     LTG 2 Progress  Not Met  -DD     LTG 3  Patient will have decreased swelling to 38 cm at the medial joint line  -DD     LTG 3 Progress  Met  -DD     LTG 4  Patient will have 5/5 manual muscle testing of quads and hamstrings  -DD     LTG 4 Progress  Not Met  -DD     LTG 5  Patient be able to do sit to stands 10 reps without upper extremity support  -DD     LTG 5 Progress  Met  -DD       User Key  (r) = Recorded By, (t) = Taken By, (c) = Cosigned By    Initials Name Provider Type    Jayashree Metz, PT DPT Physical Therapist                         Time Calculation:   Start Time: 0800  Stop Time: 0900  Time Calculation (min): 60 min  Total Timed Code Minutes- PT: 45 minute(s)  Therapy Suggested Charges     Code   Minutes Charges    None           Therapy Charges for Today     Code Description Service Date Service Provider Modifiers Qty    91532813611 HC PT THER PROC EA 15 MIN 3/7/2019 Magdalena Malloy, PTA GP 3    51050276010 HC PT ELECTRICAL STIM UNATTENDED 3/7/2019 Magdalena Malloy, PTA  1                    Magdalena Malloy, NATHALIE  3/7/2019

## 2019-03-11 ENCOUNTER — HOSPITAL ENCOUNTER (OUTPATIENT)
Dept: PHYSICAL THERAPY | Facility: HOSPITAL | Age: 54
Setting detail: THERAPIES SERIES
Discharge: HOME | End: 2019-03-11

## 2019-03-11 DIAGNOSIS — Z96.652 TOTAL KNEE REPLACEMENT STATUS, LEFT: Primary | ICD-10-CM

## 2019-03-11 PROCEDURE — 97110 THERAPEUTIC EXERCISES: CPT

## 2019-03-11 PROCEDURE — G0283 ELEC STIM OTHER THAN WOUND: HCPCS

## 2019-03-11 NOTE — PROGRESS NOTES
Outpatient Physical Therapy Ortho Treatment Note   Jama Bravo     Patient Name: Nicholas Negrete  : 1965  MRN: [id number]  Today's Date: 3/11/2019      Visit Date: 2019  90%  Subjective Improvement:     90%  Attendance:   Approved:      17 visits     MD follow up:    3-25-19        date:    3-21-19    Visit Dx:    ICD-10-CM ICD-9-CM   1. Total knee replacement status, left Z96.652 V43.65       There is no problem list on file for this patient.       Past Medical History:   Diagnosis Date   • Anxiety    • Arthritis    • Back pain    • Fibromyalgia    • GERD (gastroesophageal reflux disease)    • Hemorrhoids    • Hx of heartburn    • Hypertension    • Pain     hip, joint, neck, knee, back    • Problems with hearing    • Sleep apnea    • Sleep apnea     uses Cpap/Bipap         Past Surgical History:   Procedure Laterality Date   • BACK SURGERY      L4 and L5 fused   • BACK SURGERY     • COLONOSCOPY N/A 10/27/2017    Procedure: COLONOSCOPY;  Surgeon: Tai Hayes MD;  Location: [facility] ENDOSCOPY;  Service:        PT Ortho     Row Name 19 0700       Precautions and Contraindications    Precautions  TKA 19  -TM       Subjective Pain    Able to rate subjective pain?  yes  -TM    Post-Treatment Pain Level  0  -TM       Posture/Observations    Posture/Observations Comments  amb without AD  -TM       General ROM    GENERAL ROM COMMENTS  0-112°  -TM      User Key  (r) = Recorded By, (t) = Taken By, (c) = Cosigned By    Initials Name Provider Type    TM Magdalena Malloy, PTA Physical Therapy Assistant                      PT Assessment/Plan     Row Name 19 0800          PT Assessment    Assessment Comments  Pt doing very well with strength progression.  Conts with some HS tightness.    -TM     Patient/caregiver participated in establishment of treatment plan and goals  Yes  -TM        PT Plan    PT Frequency  3x/week  -TM     PT Plan Comments  measure girth  -TM  "      User Key  (r) = Recorded By, (t) = Taken By, (c) = Cosigned By    Initials Name Provider Type    TM Magdalena Malloy, NATHALIE Physical Therapy Assistant          Modalities     Row Name 03/11/19 0700             Ice    Ice Applied  Yes  -TM      Location  L knee  -TM      Rx Minutes  15 mins  -TM      Ice S/P Rx  Yes  -TM         ELECTRICAL STIMULATION    Attended/Unattended  Unattended  -TM      Stimulation Type  IFC  -TM      Max mAmp  30  -TM      Location/Electrode Placement/Other  L knee  -TM        User Key  (r) = Recorded By, (t) = Taken By, (c) = Cosigned By    Initials Name Provider Type    TM MalloyMagdalena, NATHALIE Physical Therapy Assistant          Exercises     Row Name 03/11/19 0700             Subjective Pain    Able to rate subjective pain?  yes  -TM      Pre-Treatment Pain Level  0  -TM      Post-Treatment Pain Level  0  -TM         Exercise 1    Exercise Name 1  PRO II twin peaks  -TM      Time 1  10 min  -TM      Additional Comments  L 5  -TM         Exercise 2    Exercise Name 2  Incline Calf S  -TM      Sets 2  3  -TM      Time 2  30\"  -TM         Exercise 3    Exercise Name 3  standing HS stretch  -TM      Sets 3  3  -TM      Time 3  30\"  -TM         Exercise 4    Exercise Name 4  standing lunge stretch  -TM      Sets 4  3  -TM      Time 4  30\"  -TM         Exercise 5    Exercise Name 5  CC TKE  -TM      Reps 5  30  -TM      Additional Comments  4 plates  -TM         Exercise 6    Exercise Name 6  CC resisted 4 way gait  -TM      Reps 6  5 each  -TM      Additional Comments  4 plates  -TM         Exercise 7    Exercise Name 7  step up and over fwd  -TM      Reps 7  30  -TM      Additional Comments  6\" step  -TM         Exercise 8    Exercise Name 8  lateral step ups  -TM      Reps 8  30  -TM      Additional Comments  6\" step  -TM         Exercise 9    Exercise Name 9  Cybex LP  -TM      Sets 9  2  -TM      Reps 9  15  -TM      Additional Comments  100#  -TM         Exercise 10    " "Exercise Name 10  Cybex CR  -TM      Sets 10  2  -TM      Reps 10  15  -TM      Additional Comments  100#  -TM         Exercise 11    Exercise Name 11  Cybex leg curl  -TM      Sets 11  2  -TM      Reps 11  15  -TM      Additional Comments  50#  -TM         Exercise 12    Exercise Name 12  Airex SLS  -TM      Reps 12  2  -TM      Time 12  30\"  -TM         Exercise 13    Exercise Name 13  inverted BOSU squats  -TM      Reps 13  30  -TM         Exercise 14    Exercise Name 14  AA heelslides  -TM      Reps 14  15  -TM        User Key  (r) = Recorded By, (t) = Taken By, (c) = Cosigned By    Initials Name Provider Type    TM Magdalena Malloy PTA Physical Therapy Assistant                        Manual Rx (last 36 hours)      Manual Treatments     Row Name 03/11/19 0700             Manual Rx 1    Manual Rx 1 Location  L knee  -TM      Manual Rx 1 Type  patella mobes  -TM      Manual Rx 1 Duration  2 min  -TM        User Key  (r) = Recorded By, (t) = Taken By, (c) = Cosigned By    Initials Name Provider Type    TM Magdalena Malloy PTA Physical Therapy Assistant          PT OP Goals     Row Name 03/11/19 0800          PT Short Term Goals    STG Date to Achieve  03/01/19  -TM     STG 1  Pt will have LEFS of 40/80  -TM     STG 1 Progress  Met  -TM     STG 2  Patient will be independent home exercise program.  -TM     STG 2 Progress  Met  -TM     STG 3  Patient will have 0 degrees active range of motion left knee extension  -TM     STG 3 Progress  Met  -TM     STG 4  Patient was able to ambulate with a cane in 3 weeks  -TM     STG 4 Progress  Met  -TM     STG 5  Patient will be able to straight leg raise without extensor lag  -TM     STG 5 Progress  Progressing  -TM     STG 6  Pt will have left knee flexion of 105  -TM     STG 6 Progress  Met  -TM        Long Term Goals    LTG 1  Patient will have 120 degrees active knee flexion  -TM     LTG 1 Progress  Not Met  -TM     LTG 2  Patient will ambulate with nonantalgic " gait and no assistive device  -     LTG 2 Progress  Not Met  -TM     LTG 3  Patient will have decreased swelling to 38 cm at the medial joint line  -TM     LTG 3 Progress  Met  -TM     LTG 4  Patient will have 5/5 manual muscle testing of quads and hamstrings  -TM     LTG 4 Progress  Not Met  -TM     LTG 5  Patient be able to do sit to stands 10 reps without upper extremity support  -     LTG 5 Progress  Met  -TM       User Key  (r) = Recorded By, (t) = Taken By, (c) = Cosigned By    Initials Name Provider Type     Magdalena Malloy PTA Physical Therapy Assistant                         Time Calculation:   Start Time: 0759  Stop Time: 0855  Time Calculation (min): 56 min  Total Timed Code Minutes- PT: 41 minute(s)  Therapy Suggested Charges     Code   Minutes Charges    None           Therapy Charges for Today     Code Description Service Date Service Provider Modifiers Qty    16397787309 HC PT THER PROC EA 15 MIN 3/11/2019 Magdalena Malloy, PTA GP 3    71560418709 HC PT ELECTRICAL STIM UNATTENDED 3/11/2019 Magdalena Malloy PTA  1                    Magdalena Malloy PTA  3/11/2019

## 2019-03-14 ENCOUNTER — HOSPITAL ENCOUNTER (OUTPATIENT)
Dept: PHYSICAL THERAPY | Facility: HOSPITAL | Age: 54
Setting detail: THERAPIES SERIES
Discharge: HOME | End: 2019-03-14

## 2019-03-14 DIAGNOSIS — Z96.652 TOTAL KNEE REPLACEMENT STATUS, LEFT: Primary | ICD-10-CM

## 2019-03-14 DIAGNOSIS — M17.12 PRIMARY OSTEOARTHRITIS OF LEFT KNEE: ICD-10-CM

## 2019-03-14 PROCEDURE — 97110 THERAPEUTIC EXERCISES: CPT | Performed by: PHYSICAL THERAPIST

## 2019-03-14 PROCEDURE — G0283 ELEC STIM OTHER THAN WOUND: HCPCS | Performed by: PHYSICAL THERAPIST

## 2019-03-14 NOTE — THERAPY TREATMENT NOTE
Outpatient Physical Therapy Ortho Treatment Note  [facility]     Patient Name: Nicholas Negrete  : 1965  MRN: [id number]  Today's Date: 3/14/2019      Visit Date: 2019  Subjective Improvement:     90%  Attendance:   Approved:      17 visits     MD follow up:    3-25-19        date:    3-21-19      Visit Dx:    ICD-10-CM ICD-9-CM   1. Total knee replacement status, left Z96.652 V43.65   2. Primary osteoarthritis of left knee M17.12 715.16          Past Medical History:   Diagnosis Date   • Anxiety    • Arthritis    • Back pain    • Fibromyalgia    • GERD (gastroesophageal reflux disease)    • Hemorrhoids    • Hx of heartburn    • Hypertension    • Pain     hip, joint, neck, knee, back    • Problems with hearing    • Sleep apnea    • Sleep apnea     uses Cpap/Bipap         Past Surgical History:   Procedure Laterality Date   • BACK SURGERY      L4 and L5 fused   • BACK SURGERY     • COLONOSCOPY N/A 10/27/2017    Procedure: COLONOSCOPY;  Surgeon: Tai Hayes MD;  Location: [facility] ENDOSCOPY;  Service:      Knee 3-98 AROM.   Overpressure to 0 extension. And 106 AAROM flexion.            PT Assessment/Plan     Row Name 19 1004          PT Assessment    Assessment Comments  Decreased knee flexion today and poor VMO recruitment.  Needs to isolate VMO and work on CKC. flexion ROM.  -DD     Rehab Potential  Excellent  -DD       User Key  (r) = Recorded By, (t) = Taken By, (c) = Cosigned By    Initials Name Provider Type    DD Jayashree Encinas, PT DPT Physical Therapist          Modalities     Row Name 19 0743             Ice    Ice Applied  Yes  -DD      Location  L knee  -DD      Rx Minutes  15 mins  -DD      Ice S/P Rx  Yes  -DD         ELECTRICAL STIMULATION    Attended/Unattended  Unattended  -DD      Stimulation Type  IFC  -DD      Max mAmp  30  -DD      Location/Electrode Placement/Other  L knee  -DD        User Key  (r) = Recorded By, (t) = Taken By, (c) =  "Cosigned By    Initials Name Provider Type    DD Jayashree Encinas, PT DPT Physical Therapist          Exercises     Row Name 03/14/19 0743             Subjective Comments    Subjective Comments  knee popped yesterday and the pain was really sharp and it tingled for just a little bit afterward.  Stiffer this morning.  -DD         Subjective Pain    Able to rate subjective pain?  yes  -DD      Pre-Treatment Pain Level  0  -DD      Post-Treatment Pain Level  0  -DD         Exercise 1    Exercise Name 1  Elliptical Quick start  -DD      Time 1  10 min  -DD      Additional Comments  ramp 4  -DD         Exercise 2    Exercise Name 2  Incline Calf S  -DD      Sets 2  3  -DD      Time 2  30\"  -DD         Exercise 3    Exercise Name 3  standing HS stretch  -DD      Sets 3  3  -DD      Time 3  30\"  -DD         Exercise 4    Exercise Name 4  standing lunge stretch  -DD      Sets 4  3  -DD      Time 4  30\"  -DD         Exercise 5    Exercise Name 5  CC TKE  -DD      Reps 5  30  -DD      Additional Comments  4 pl  -DD         Exercise 6    Exercise Name 6  CC resisted 4 way gait  -DD      Reps 6  5 each  -DD      Additional Comments  4 pl  -DD         Exercise 7    Exercise Name 7  step up on fitness platform  -DD      Reps 7  30  -DD         Exercise 8    Exercise Name 8  reverse BOSU squats  -DD      Reps 8  30  -DD         Exercise 9    Exercise Name 9  Cybex LP  -DD      Sets 9  2  -DD      Reps 9  15  -DD      Additional Comments  100#  -DD         Exercise 10    Exercise Name 10  Cybex CR  -DD      Sets 10  2  -DD      Reps 10  15  -DD      Additional Comments  100#  -DD         Exercise 11    Exercise Name 11  Cybex leg curl  -DD      Sets 11  2  -DD      Reps 11  15  -DD      Additional Comments  50#  -DD         Exercise 12    Exercise Name 12  Airex SLS  -DD      Reps 12  2  -DD      Time 12  30\"  -DD         Exercise 13    Exercise Name 13  heel slides strap  -DD      Reps 13  15  -DD         Exercise 14    " Exercise Name 14  Cybex LP1  -DD      Reps 14  30  -DD      Additional Comments  60#  -DD         Exercise 15    Exercise Name 15  Quad set with Russian stim   -DD      Time 15  6'  -DD         Exercise 16    Exercise Name 16  SLR VMO  -DD      Reps 16  20  -DD        User Key  (r) = Recorded By, (t) = Taken By, (c) = Cosigned By    Initials Name Provider Type    Jayashree Metz, PT DPT Physical Therapist            PT OP Goals     Row Name 03/14/19 0743          PT Short Term Goals    STG Date to Achieve  03/01/19  -DD     STG 1  Pt will have LEFS of 40/80  -DD     STG 1 Progress  Met  -DD     STG 2  Patient will be independent home exercise program.  -DD     STG 2 Progress  Met  -DD     STG 3  Patient will have 0 degrees active range of motion left knee extension  -DD     STG 3 Progress  Met  -DD     STG 4  Patient was able to ambulate with a cane in 3 weeks  -DD     STG 4 Progress  Met  -DD     STG 5  Patient will be able to straight leg raise without extensor lag  -DD     STG 5 Progress  Progressing  -DD     STG 6  Pt will have left knee flexion of 105  -DD     STG 6 Progress  Met  -DD        Long Term Goals    LTG 1  Patient will have 120 degrees active knee flexion  -DD     LTG 1 Progress  Not Met  -DD     LTG 2  Patient will ambulate with nonantalgic gait and no assistive device  -DD     LTG 2 Progress  Progressing  -DD     LTG 3  Patient will have decreased swelling to 38 cm at the medial joint line  -DD     LTG 3 Progress  Met  -DD     LTG 4  Patient will have 5/5 manual muscle testing of quads and hamstrings  -DD     LTG 4 Progress  Not Met  -DD     LTG 5  Patient be able to do sit to stands 10 reps without upper extremity support  -DD     LTG 5 Progress  Met  -DD       User Key  (r) = Recorded By, (t) = Taken By, (c) = Cosigned By    Initials Name Provider Type    Jayashree Metz, PT DPT Physical Therapist        Time Calculation:   Start Time: 0743  Stop Time: 0923  Time Calculation  (min): 100 min  Total Timed Code Minutes- PT: 59 minute(s)  Therapy Suggested Charges     Code   Minutes Charges    None           Therapy Charges for Today     Code Description Service Date Service Provider Modifiers Qty    42561092028 HC PT ELECTRICAL STIM UNATTENDED 3/14/2019 Jayashree Encinas, PT DPT  1    49125682309 HC PT THER PROC EA 15 MIN 3/14/2019 Jayashree Encinas, PT DPT GP 4        Jayashree Encinas, PT DPT  3/14/2019

## 2019-03-18 ENCOUNTER — HOSPITAL ENCOUNTER (OUTPATIENT)
Dept: PHYSICAL THERAPY | Facility: HOSPITAL | Age: 54
Setting detail: THERAPIES SERIES
Discharge: HOME | End: 2019-03-18

## 2019-03-18 DIAGNOSIS — Z96.652 TOTAL KNEE REPLACEMENT STATUS, LEFT: Primary | ICD-10-CM

## 2019-03-18 PROCEDURE — G0283 ELEC STIM OTHER THAN WOUND: HCPCS

## 2019-03-18 PROCEDURE — 97110 THERAPEUTIC EXERCISES: CPT

## 2019-03-18 NOTE — PROGRESS NOTES
Outpatient Physical Therapy Ortho Treatment Note   Jama Bravo     Patient Name: Nicholas Negrete  : 1965  MRN: [id number]  Today's Date: 3/18/2019      Visit Date: 2019    Subjective Improvement:     90%  Attendance: 15/15  Approved:     17 visits    MD follow up:     3-25-19       date:     3-21-19    Visit Dx:    ICD-10-CM ICD-9-CM   1. Total knee replacement status, left Z96.652 V43.65       There is no problem list on file for this patient.       Past Medical History:   Diagnosis Date   • Anxiety    • Arthritis    • Back pain    • Fibromyalgia    • GERD (gastroesophageal reflux disease)    • Hemorrhoids    • Hx of heartburn    • Hypertension    • Pain     hip, joint, neck, knee, back    • Problems with hearing    • Sleep apnea    • Sleep apnea     uses Cpap/Bipap         Past Surgical History:   Procedure Laterality Date   • BACK SURGERY      L4 and L5 fused   • BACK SURGERY     • COLONOSCOPY N/A 10/27/2017    Procedure: COLONOSCOPY;  Surgeon: Tai Hayes MD;  Location: [facility] ENDOSCOPY;  Service:        PT Ortho     Row Name 19 0700       Subjective Comments    Subjective Comments  Reports the stiffness is better today.    -TM       Precautions and Contraindications    Precautions  TKA 19  -TM       Subjective Pain    Able to rate subjective pain?  yes  -TM    Pre-Treatment Pain Level  0  -TM       Posture/Observations    Posture/Observations Comments  amb without AD  -TM       General ROM    GENERAL ROM COMMENTS  0-112 AAROM  -TM      User Key  (r) = Recorded By, (t) = Taken By, (c) = Cosigned By    Initials Name Provider Type    TM Magdalena Malloy, PTA Physical Therapy Assistant                      PT Assessment/Plan     Row Name 19 0800          PT Assessment    Assessment Comments  Pt with improved ROM today.  Tolerating Russian quad stim well for increased VMO recruitment.    -TM     Patient/caregiver participated in establishment of  "treatment plan and goals  Yes  -TM        PT Plan    PT Frequency  3x/week  -TM     PT Plan Comments  1 more week.  MD note for 3-25 appt.  -TM       User Key  (r) = Recorded By, (t) = Taken By, (c) = Cosigned By    Initials Name Provider Type    TM Magdalena Malloy PTA Physical Therapy Assistant          Modalities     Row Name 03/18/19 0700             Ice    Ice Applied  Yes  -TM      Location  L knee  -TM      Rx Minutes  15 mins  -TM      Ice S/P Rx  Yes  -TM         ELECTRICAL STIMULATION    Attended/Unattended  Unattended  -TM      Stimulation Type  IFC  -TM      Max mAmp  30  -TM      Location/Electrode Placement/Other  L knee  -TM        User Key  (r) = Recorded By, (t) = Taken By, (c) = Cosigned By    Initials Name Provider Type    TM Magdalena Malloy PTA Physical Therapy Assistant          Exercises     Row Name 03/18/19 0700             Subjective Comments    Subjective Comments  Reports the stiffness is better today.    -TM         Subjective Pain    Able to rate subjective pain?  yes  -TM      Pre-Treatment Pain Level  0  -TM         Exercise 1    Exercise Name 1  Elliptical Quick start  -TM      Time 1  10  min  -TM      Additional Comments  ramp 4  -TM         Exercise 2    Exercise Name 2  Incline Calf S  -TM      Sets 2  3  -TM      Time 2  30\"  -TM         Exercise 3    Exercise Name 3  standing HS stretch  -TM      Sets 3  3  -TM      Time 3  30\"  -TM         Exercise 4    Exercise Name 4  standing lunge stretch  -TM      Sets 4  3  -TM      Time 4  30\"  -TM         Exercise 5    Exercise Name 5  CC TKE  -TM      Reps 5  30  -TM      Additional Comments  4 plates  -TM         Exercise 6    Exercise Name 6  CC resisted 4 way gait  -TM      Reps 6  5 each  -TM      Additional Comments  4 plates  -TM         Exercise 7    Exercise Name 7  inverted BOSU squats  -TM      Reps 7  30  -TM         Exercise 8    Exercise Name 8  step up/down   -TM      Reps 8  20  -TM      Additional Comments  8\" " platform  -TM         Exercise 9    Exercise Name 9  Cybex LP  -TM      Sets 9  2  -TM      Reps 9  15  -TM      Additional Comments  100#  -TM         Exercise 10    Exercise Name 10  Cybex CR  -TM      Sets 10  2  -TM      Reps 10  15  -TM      Additional Comments  100#  -TM         Exercise 11    Exercise Name 11  Cybex leg curl  -TM      Sets 11  2  -TM      Reps 11  15  -TM      Additional Comments  50#  -TM         Exercise 12    Exercise Name 12  Russian stim w/SAQ  -TM      Time 12  6 min  -TM      Additional Comments  31 mA  -TM         Exercise 13    Exercise Name 13   AA heelslides w/strap  -TM      Reps 13  15  -TM         Exercise 14    Exercise Name 14  patella mobes   -TM      Time 14  2 min  -TM        User Key  (r) = Recorded By, (t) = Taken By, (c) = Cosigned By    Initials Name Provider Type    TM Magdalena Malloy PTA Physical Therapy Assistant                        Manual Rx (last 36 hours)      Manual Treatments     Row Name 03/18/19 0900             Manual Rx 1    Manual Rx 1 Location  L knee  -TM      Manual Rx 1 Type  patella mobes  -TM      Manual Rx 1 Duration  2 min  -TM        User Key  (r) = Recorded By, (t) = Taken By, (c) = Cosigned By    Initials Name Provider Type    TM Magdalena Malloy PTA Physical Therapy Assistant          PT OP Goals     Row Name 03/18/19 0800          PT Short Term Goals    STG Date to Achieve  03/01/19  -TM     STG 1  Pt will have LEFS of 40/80  -TM     STG 1 Progress  Met  -TM     STG 2  Patient will be independent home exercise program.  -TM     STG 2 Progress  Met  -TM     STG 3  Patient will have 0 degrees active range of motion left knee extension  -TM     STG 3 Progress  Met  -TM     STG 4  Patient was able to ambulate with a cane in 3 weeks  -TM     STG 4 Progress  Met  -TM     STG 5  Patient will be able to straight leg raise without extensor lag  -TM     STG 5 Progress  Progressing  -TM     STG 6  Pt will have left knee flexion of 105  -TM      STG 6 Progress  Met  -TM        Long Term Goals    LTG 1  Patient will have 120 degrees active knee flexion  -     LTG 1 Progress  Not Met  -TM     LTG 2  Patient will ambulate with nonantalgic gait and no assistive device  -     LTG 2 Progress  Met  -TM     LTG 3  Patient will have decreased swelling to 38 cm at the medial joint line  -     LTG 3 Progress  Met  -TM     LTG 4  Patient will have 5/5 manual muscle testing of quads and hamstrings  -     LTG 4 Progress  Not Met  -TM     LTG 5  Patient be able to do sit to stands 10 reps without upper extremity support  -     LTG 5 Progress  Met  -TM       User Key  (r) = Recorded By, (t) = Taken By, (c) = Cosigned By    Initials Name Provider Type     Magdalena Malloy PTA Physical Therapy Assistant                         Time Calculation:   Start Time: 0756  Stop Time: 0901  Time Calculation (min): 65 min  Total Timed Code Minutes- PT: 50 minute(s)  Therapy Suggested Charges     Code   Minutes Charges    None           Therapy Charges for Today     Code Description Service Date Service Provider Modifiers Qty    67127435964 HC PT THER PROC EA 15 MIN 3/18/2019 Magdalena Malloy PTA GP 3    85866648751 HC PT ELECTRICAL STIM UNATTENDED 3/18/2019 Magdalena Malloy PTA  1                    Magdalena Malloy PTA  3/18/2019

## 2019-03-21 ENCOUNTER — HOSPITAL ENCOUNTER (OUTPATIENT)
Dept: PHYSICAL THERAPY | Facility: HOSPITAL | Age: 54
Setting detail: THERAPIES SERIES
Discharge: HOME | End: 2019-03-21

## 2019-03-21 DIAGNOSIS — M17.12 PRIMARY OSTEOARTHRITIS OF LEFT KNEE: ICD-10-CM

## 2019-03-21 DIAGNOSIS — Z96.652 TOTAL KNEE REPLACEMENT STATUS, LEFT: Primary | ICD-10-CM

## 2019-03-21 PROCEDURE — 97110 THERAPEUTIC EXERCISES: CPT

## 2019-03-21 PROCEDURE — 97110 THERAPEUTIC EXERCISES: CPT | Performed by: PHYSICAL THERAPIST

## 2019-03-21 PROCEDURE — G0283 ELEC STIM OTHER THAN WOUND: HCPCS | Performed by: PHYSICAL THERAPIST

## 2019-03-21 NOTE — THERAPY PROGRESS REPORT/RE-CERT
Outpatient Physical Therapy Ortho Progress Note  [facility]     Patient Name: Nicholas Negrete  : 1965  MRN: [id number]  Today's Date: 3/21/2019      Visit Date: 2019  1616 (17)  MD follow up 3/25.    Visit Dx:    ICD-10-CM ICD-9-CM   1. Total knee replacement status, left Z96.652 V43.65   2. Primary osteoarthritis of left knee M17.12 715.16            Past Medical History:   Diagnosis Date   • Anxiety    • Arthritis    • Back pain    • Fibromyalgia    • GERD (gastroesophageal reflux disease)    • Hemorrhoids    • Hx of heartburn    • Hypertension    • Pain     hip, joint, neck, knee, back    • Problems with hearing    • Sleep apnea    • Sleep apnea     uses Cpap/Bipap         Past Surgical History:   Procedure Laterality Date   • BACK SURGERY      L4 and L5 fused   • BACK SURGERY     • COLONOSCOPY N/A 10/27/2017    Procedure: COLONOSCOPY;  Surgeon: Tai Hayes MD;  Location: [facility] ENDOSCOPY;  Service:                        PT Assessment/Plan     Row Name 19 0802          PT Assessment    Impairments  Gait;Poor body mechanics;Muscle strength  -DD     Assessment Comments  Pt making routine improvement with ROM. Still lacks SLR without a slight lag but improved since last week.  MD followup next week and 1 remaining therapy visit.  Has earned free month tofitness.  -DD     Please refer to paper survey for additional self-reported information  Yes  -DD     Rehab Potential  Excellent  -DD     Patient/caregiver participated in establishment of treatment plan and goals  Yes  -DD     Patient would benefit from skilled therapy intervention  Yes  -DD        PT Plan    PT Frequency  1x/week  -DD     Predicted Duration of Therapy Intervention (Therapy Eval)  1  -DD     PT Plan Comments  1 visit left. Expect DC to independent program.  -DD       User Key  (r) = Recorded By, (t) = Taken By, (c) = Cosigned By    Initials Name Provider Type    Jayashree Metz, PT DPT Physical  "Therapist            Exercises     Row Name 03/21/19 0700             Subjective Pain    Able to rate subjective pain?  yes  -TM      Pre-Treatment Pain Level  0  -TM         Exercise 1    Exercise Name 1  Elliptical Quick start  -TM      Time 1  10 min  -TM      Additional Comments  ramp 4  -TM         Exercise 2    Exercise Name 2  Incline Calf S  -TM      Sets 2  3  -TM      Time 2  30\"  -TM         Exercise 3    Exercise Name 3  standing HS stretch  -TM      Sets 3  3  -TM      Time 3  30\"  -TM         Exercise 4    Exercise Name 4  standing lunge stretch  -TM      Sets 4  3  -TM      Time 4  30\"  -TM         Exercise 5    Exercise Name 5  CC TKE  -TM      Reps 5  30  -TM      Additional Comments  4 plates  -TM         Exercise 6    Exercise Name 6  CC resisted 4 way gait  -TM      Reps 6  5 each  -TM      Additional Comments  4 plates  -TM         Exercise 7    Exercise Name 7  inverted BOSU squats  -TM      Reps 7  30  -TM         Exercise 8    Exercise Name 8  Cybex LP2  -TM      Sets 8  2  -TM      Reps 8  15  -TM      Additional Comments  100#  -TM         Exercise 9    Exercise Name 9  Cybex CR (single leg)  -TM      Sets 9  2  -TM      Reps 9  15  -TM      Additional Comments  100#  -TM         Exercise 10    Exercise Name 10  Cybex leg curl  -TM      Sets 10  2  -TM      Reps 10  15  -TM      Additional Comments  50#  -TM         Exercise 11    Exercise Name 11  step up/eccentric lower  -TM      Reps 11  20  -TM      Additional Comments  8\" platform  -TM         Exercise 12    Exercise Name 12  step up lateral  -TM      Reps 12  20  -TM      Additional Comments  8\" platform  -TM         Exercise 13    Exercise Name 13  SLS with ball toss  -TM      Sets 13  2  -TM      Time 13  30\"  -TM         Exercise 14    Exercise Name 14  T Band 4 way SLR  -TM      Sets 14  20 each  -TM      Additional Comments  green  -TM        User Key  (r) = Recorded By, (t) = Taken By, (c) = Cosigned By    Initials Name Provider " Type    TM Magdalena Malloy, PTA Physical Therapy Assistant                       PT OP Goals     Row Name 03/21/19 0854 03/21/19 0745       PT Short Term Goals    STG Date to Achieve  --  03/01/19  -DD    STG 1  --  Pt will have LEFS of 40/80  -DD    STG 1 Progress  --  Met  -DD    STG 2  --  Patient will be independent home exercise program.  -DD    STG 2 Progress  --  Met  -DD    STG 3  --  Patient will have 0 degrees active range of motion left knee extension  -DD    STG 3 Progress  --  Met  -DD    STG 4  --  Patient was able to ambulate with a cane in 3 weeks  -DD    STG 4 Progress  --  Met  -DD    STG 5  --  Patient will be able to straight leg raise without extensor lag  -DD    STG 5 Progress  --  Progressing  -DD    STG 5 Progress Comments  --  improved  -DD    STG 6  --  Pt will have left knee flexion of 105  -DD    STG 6 Progress  --  Met  -DD       Long Term Goals    LTG 1  --  Patient will have 120 degrees active knee flexion  -DD    LTG 1 Progress  --  Progressing  -DD    LTG 1 Progress Comments  --  112  -DD    LTG 2  --  Patient will ambulate with nonantalgic gait and no assistive device  -DD    LTG 2 Progress  --  Met  -DD    LTG 3  --  Patient will have decreased swelling to 38 cm at the medial joint line  -DD    LTG 3 Progress  --  Met  -DD    LTG 4  --  Patient will have 5/5 manual muscle testing of quads and hamstrings  -DD    LTG 4 Progress  --  Met  -DD    LTG 5  --  Patient be able to do sit to stands 10 reps without upper extremity support  -DD    LTG 5 Progress  --  Met  -DD       Time Calculation    PT Goal Re-Cert Due Date  04/18/19  -DD  --      User Key  (r) = Recorded By, (t) = Taken By, (c) = Cosigned By    Initials Name Provider Type    Jayashree Metz, PT DPT Physical Therapist               Outcome Measure Options: Lower Extremity Functional Scale (LEFS)  Lower Extremity Functional Index  Any of your usual work, housework or school activities: A little bit of  difficulty  Your usual hobbies, recreational or sporting activities: A little bit of difficulty  Getting into or out of the bath: A little bit of difficulty  Walking between rooms: A little bit of difficulty  Putting on your shoes or socks: A little bit of difficulty  Squatting: Moderate difficulty  Lifting an object, like a bag of groceries from the floor: No difficulty  Performing light activities around your home: No difficulty  Performing heavy activities around your home: A little bit of difficulty  Getting into or out of a car: A little bit of difficulty  Walking 2 blocks: No difficulty  Walking a mile: No difficulty  Going up or down 10 stairs (about 1 flight of stairs): A little bit of difficulty  Standing for 1 hour: A little bit of difficulty  Sitting for 1 hour: A little bit of difficulty  Running on even ground: Extreme difficulty or unable to perform activity  Running on uneven ground: Extreme difficulty or unable to perform activity  Making sharp turns while running fast: Extreme difficulty or unable to perform activity  Hopping: Extreme difficulty or unable to perform activity  Rolling over in bed: No difficulty  Total: 52      Time Calculation:   Start Time: 0745  Stop Time: 0850  Time Calculation (min): 65 min  Total Timed Code Minutes- PT: 48 minute(s)  Therapy Charges for Today     Code Description Service Date Service Provider Modifiers Qty    83117436512 HC PT THER PROC EA 15 MIN 3/21/2019 Jayashree Encinas PT DPT GP 1    18564309836 HC PT ELECTRICAL STIM UNATTENDED 3/21/2019 Jayashree Encinas PT DPT  1          PT G-Codes  Outcome Measure Options: Lower Extremity Functional Scale (LEFS)  Total: 52         Jayashree Encinas PT DPT  3/21/2019

## 2019-03-26 ENCOUNTER — APPOINTMENT (OUTPATIENT)
Dept: PHYSICAL THERAPY | Facility: HOSPITAL | Age: 54
End: 2019-03-26

## 2019-03-26 ENCOUNTER — DOCUMENTATION (OUTPATIENT)
Dept: PHYSICAL THERAPY | Facility: HOSPITAL | Age: 54
End: 2019-03-26

## 2020-01-08 ENCOUNTER — LAB (OUTPATIENT)
Dept: ONCOLOGY | Facility: HOSPITAL | Age: 55
End: 2020-01-08

## 2020-01-08 ENCOUNTER — CONSULT (OUTPATIENT)
Dept: ONCOLOGY | Facility: CLINIC | Age: 55
End: 2020-01-08

## 2020-01-08 VITALS
DIASTOLIC BLOOD PRESSURE: 92 MMHG | TEMPERATURE: 98.3 F | HEART RATE: 81 BPM | RESPIRATION RATE: 18 BRPM | SYSTOLIC BLOOD PRESSURE: 138 MMHG | BODY MASS INDEX: 35.25 KG/M2 | HEIGHT: 69 IN | WEIGHT: 238 LBS

## 2020-01-08 DIAGNOSIS — R59.1 LYMPHADENOPATHY: ICD-10-CM

## 2020-01-08 DIAGNOSIS — R16.1 SPLENOMEGALY: Primary | ICD-10-CM

## 2020-01-08 DIAGNOSIS — R16.1 SPLENOMEGALY: ICD-10-CM

## 2020-01-08 LAB
ALBUMIN SERPL-MCNC: 4.8 G/DL (ref 3.5–5.2)
ALBUMIN/GLOB SERPL: 1.6 G/DL
ALP SERPL-CCNC: 85 U/L (ref 39–117)
ALT SERPL W P-5'-P-CCNC: 28 U/L (ref 1–41)
ANION GAP SERPL CALCULATED.3IONS-SCNC: 12 MMOL/L (ref 5–15)
AST SERPL-CCNC: 21 U/L (ref 1–40)
BASOPHILS # BLD AUTO: 0.08 10*3/MM3 (ref 0–0.2)
BASOPHILS NFR BLD AUTO: 1.2 % (ref 0–1.5)
BILIRUB SERPL-MCNC: 0.7 MG/DL (ref 0.2–1.2)
BUN BLD-MCNC: 18 MG/DL (ref 6–20)
BUN/CREAT SERPL: 19.4 (ref 7–25)
CALCIUM SPEC-SCNC: 10.1 MG/DL (ref 8.6–10.5)
CHLORIDE SERPL-SCNC: 101 MMOL/L (ref 98–107)
CO2 SERPL-SCNC: 26 MMOL/L (ref 22–29)
CREAT BLD-MCNC: 0.93 MG/DL (ref 0.76–1.27)
DEPRECATED RDW RBC AUTO: 39.9 FL (ref 37–54)
EOSINOPHIL # BLD AUTO: 0.16 10*3/MM3 (ref 0–0.4)
EOSINOPHIL NFR BLD AUTO: 2.3 % (ref 0.3–6.2)
ERYTHROCYTE [DISTWIDTH] IN BLOOD BY AUTOMATED COUNT: 13.6 % (ref 12.3–15.4)
GFR SERPL CREATININE-BSD FRML MDRD: 85 ML/MIN/1.73
GLOBULIN UR ELPH-MCNC: 3 GM/DL
GLUCOSE BLD-MCNC: 96 MG/DL (ref 65–99)
HCT VFR BLD AUTO: 44 % (ref 37.5–51)
HGB BLD-MCNC: 15.5 G/DL (ref 13–17.7)
IMM GRANULOCYTES # BLD AUTO: 0.04 10*3/MM3 (ref 0–0.05)
IMM GRANULOCYTES NFR BLD AUTO: 0.6 % (ref 0–0.5)
LDH SERPL-CCNC: 178 U/L (ref 135–225)
LYMPHOCYTES # BLD AUTO: 2.13 10*3/MM3 (ref 0.7–3.1)
LYMPHOCYTES NFR BLD AUTO: 31 % (ref 19.6–45.3)
MCH RBC QN AUTO: 29 PG (ref 26.6–33)
MCHC RBC AUTO-ENTMCNC: 35.2 G/DL (ref 31.5–35.7)
MCV RBC AUTO: 82.4 FL (ref 79–97)
MONOCYTES # BLD AUTO: 0.56 10*3/MM3 (ref 0.1–0.9)
MONOCYTES NFR BLD AUTO: 8.2 % (ref 5–12)
NEUTROPHILS # BLD AUTO: 3.89 10*3/MM3 (ref 1.7–7)
NEUTROPHILS NFR BLD AUTO: 56.7 % (ref 42.7–76)
NRBC BLD AUTO-RTO: 0 /100 WBC (ref 0–0.2)
PLATELET # BLD AUTO: 209 10*3/MM3 (ref 140–450)
PMV BLD AUTO: 10.5 FL (ref 6–12)
POTASSIUM BLD-SCNC: 3.8 MMOL/L (ref 3.5–5.2)
PROT SERPL-MCNC: 7.8 G/DL (ref 6–8.5)
RBC # BLD AUTO: 5.34 10*6/MM3 (ref 4.14–5.8)
SODIUM BLD-SCNC: 139 MMOL/L (ref 136–145)
WBC NRBC COR # BLD: 6.86 10*3/MM3 (ref 3.4–10.8)

## 2020-01-08 PROCEDURE — 85060 BLOOD SMEAR INTERPRETATION: CPT

## 2020-01-08 PROCEDURE — 83615 LACTATE (LD) (LDH) ENZYME: CPT

## 2020-01-08 PROCEDURE — 85025 COMPLETE CBC W/AUTO DIFF WBC: CPT

## 2020-01-08 PROCEDURE — G0463 HOSPITAL OUTPT CLINIC VISIT: HCPCS | Performed by: INTERNAL MEDICINE

## 2020-01-08 PROCEDURE — 99205 OFFICE O/P NEW HI 60 MIN: CPT | Performed by: INTERNAL MEDICINE

## 2020-01-08 PROCEDURE — 82784 ASSAY IGA/IGD/IGG/IGM EACH: CPT

## 2020-01-08 PROCEDURE — 83883 ASSAY NEPHELOMETRY NOT SPEC: CPT

## 2020-01-08 PROCEDURE — 80053 COMPREHEN METABOLIC PANEL: CPT

## 2020-01-08 PROCEDURE — 84165 PROTEIN E-PHORESIS SERUM: CPT

## 2020-01-08 PROCEDURE — 86334 IMMUNOFIX E-PHORESIS SERUM: CPT

## 2020-01-09 LAB
ALBUMIN SERPL-MCNC: 4 G/DL (ref 2.9–4.4)
ALBUMIN/GLOB SERPL: 1.1 {RATIO} (ref 0.7–1.7)
ALPHA1 GLOB FLD ELPH-MCNC: 0.3 G/DL (ref 0–0.4)
ALPHA2 GLOB SERPL ELPH-MCNC: 0.7 G/DL (ref 0.4–1)
B-GLOBULIN SERPL ELPH-MCNC: 1.2 G/DL (ref 0.7–1.3)
GAMMA GLOB SERPL ELPH-MCNC: 1.5 G/DL (ref 0.4–1.8)
GLOBULIN SER CALC-MCNC: 3.6 G/DL (ref 2.2–3.9)
IGA SERPL-MCNC: 252 MG/DL (ref 90–386)
IGG SERPL-MCNC: 1231 MG/DL (ref 700–1600)
IGM SERPL-MCNC: 208 MG/DL (ref 20–172)
KAPPA LC SERPL-MCNC: 18 MG/L (ref 3.3–19.4)
KAPPA LC/LAMBDA SER: 1.01 {RATIO} (ref 0.26–1.65)
LAMBDA LC FREE SERPL-MCNC: 17.9 MG/L (ref 5.7–26.3)
Lab: NORMAL
M-SPIKE: NORMAL G/DL
PROT PATTERN SERPL ELPH-IMP: NORMAL
PROT PATTERN SERPL IFE-IMP: ABNORMAL
PROT SERPL-MCNC: 7.6 G/DL (ref 6–8.5)

## 2020-01-10 NOTE — PROGRESS NOTES
Nicholas Negrete  [id number]  1965      REASON FOR CONSULTATION: Splenomegaly, lymphadenopathy  Provide an opinion on any further workup or treatment                             REQUESTING PHYSICIAN:  Fab Vyas MD     RECORDS OBTAINED:  Records of the patients history including those obtained from the referring provider were reviewed and summarized in detail.    History of Present Illness     This is a pleasant 54 year old male who was seen in consultation at the request of Dr Vyas for evaluation of splenomegaly. Patient unfortunately is a poor historian and most of the history was obtained by reviewing old medical records. Patient has past medical history obesity, fibromyalgia, arthritis, hypertension, sleep apnea.  Patient has been experiencing left-sided abdominal pain which is located in left upper mid and lower quadrant.  Patient tells me that pain started about 4 to 6 weeks ago.  No trauma.  Reports his pain is worse after eating meal.  No relieving factor.  Pain is persistent.  No change in the pain with  bowel movements.  Denies any history of constipation or diarrhea.  No blood in the stool.  As a part of work-up his primary care provider order CT scan of abdomen pelvis.Patient had CT scan of abdomen pelvis with and without contrast performed on December 9, 2019.  This showed hepatic steatosis.  Showed mildly enlarged spleen measuring 15.3 cm.  Compared to prior CT scan from August 2017 spleen was slightly enlarged.  At that time his spleen was 14.7 cm.  He also had mildly enlarged para-aortic lymph node measuring 15 x 6 mm.  None of these lymph nodes felt to be enlarged pathologically as per CT criteria.    He denies any prior history of hematological disorder.  Denies any unintentional weight loss or drenching night sweats or high fevers.  No history of autoimmune disease.  No family history of hematological disorder.    I have been asked to evaluate and manage his splenomegaly.        Past  Medical History:   Diagnosis Date   • Anxiety    • Arthritis    • Back pain    • Fibromyalgia    • GERD (gastroesophageal reflux disease)    • Hemorrhoids    • Hx of heartburn    • Hypertension    • Pain     hip, joint, neck, knee, back    • Problems with hearing    • Sleep apnea    • Sleep apnea     uses Cpap/Bipap    • Splenomegaly         Past Surgical History:   Procedure Laterality Date   • BACK SURGERY      L4 and L5 fused   • BACK SURGERY  2011   • COLONOSCOPY N/A 10/27/2017    Procedure: COLONOSCOPY;  Surgeon: Tai Hayes MD;  Location: [facility] ENDOSCOPY;  Service:    • REPLACEMENT TOTAL KNEE Left 02/2019        Current Outpatient Medications on File Prior to Visit   Medication Sig Dispense Refill   • aspirin 81 MG chewable tablet Chew 81 mg Daily.     • OMEPRAZOLE PO Take 20 mg by mouth As Needed.     • Pregabalin (LYRICA PO) Take 150 mg by mouth Daily.     • zolpidem (AMBIEN) 10 MG tablet Take 5 mg by mouth Daily.       No current facility-administered medications on file prior to visit.         ALLERGIES:    Allergies   Allergen Reactions   • Penicillins Rash        Social History     Socioeconomic History   • Marital status:      Spouse name: Not on file   • Number of children: Not on file   • Years of education: Not on file   • Highest education level: Not on file   Tobacco Use   • Smoking status: Never Smoker   • Smokeless tobacco: Never Used   Substance and Sexual Activity   • Alcohol use: Yes     Alcohol/week: 1.0 standard drinks     Types: 1 Glasses of wine per week     Comment: occassionally   • Drug use: No   • Sexual activity: Defer        Family History   Problem Relation Age of Onset   • Heart attack Father    • Heart disease Mother         Review of Systems       CONSTITUTIONAL: fatigue +  No weight loss, fever, chills, weakness.  HEENT: Eyes: No visual loss, blurred vision, double vision or yellow sclerae. Ears, Nose, Throat: No hearing loss, sneezing, congestion, runny nose or sore  "throat.  SKIN: No rash or itching.  CARDIOVASCULAR: No chest pain, chest pressure or chest discomfort. No palpitations or edema.  RESPIRATORY: No shortness of breath, cough or sputum.  GASTROINTESTINAL:abdominal pain +  No anorexia, nausea, vomiting or diarrhea. No  blood.  GENITOURINARY: Negative for urgency, frequency or dysuria.   NEUROLOGICAL: No headache, dizziness, syncope, paralysis, ataxia, numbness or tingling in the extremities. No change in bowel or bladder control.  MUSCULOSKELETAL: chronic pain + .  HEMATOLOGIC: No anemia, bleeding or bruising.  LYMPHATICS: No enlarged nodes. No history of splenectomy.  PSYCHIATRIC: No history of depression or anxiety.  ENDOCRINOLOGIC: No reports of sweating, cold or heat intolerance. No polyuria or polydipsia.  ALLERGIES: No history of asthma, hives, eczema or rhinitis.      Objective     Vitals:    01/08/20 1306   BP: 138/92   Pulse: 81   Resp: 18   Temp: 98.3 °F (36.8 °C)   Weight: 108 kg (238 lb)   Height: 175.3 cm (69\")   PainSc:   8   PainLoc: Abdomen     Current Status 1/8/2020   ECOG score 0       Physical Exam      GENERAL: Alert, awake, oriented.  Well dressed.  Not in apparent distress. Vitals as above.   HEAD: Normocephalic, atraumatic.   EYES: PERRL, EOMI. Fundi normal, vision is grossly intact.  NECK: Supple, no adenopathy or thyromegaly.   THROAT: Normal oral cavity and pharynx. No inflammation, swelling, exudate, or lesions.  CARDIAC: Normal S1 and S2. No S3, S4 or murmurs. Rhythm is regular.  Extremities are warm and well perfused.   LUNGS: Clear to auscultation and percussion without rales, rhonchi, wheezing or diminished breath sounds.  ABDOMEN: Positive bowel sounds. Soft, nondistended, nontender. No guarding or rebound. No masses.  BACK:  No bony tenderness.   EXTREMITIES: No significant deformity or joint abnormality.  Peripheral pulses intact. No varicosities.  SKIN: No rash or bruising.  NEUROLOGICAL: Grossly non-focal exam. No focal weakness. " Gait: Normal.   PSYCH: Mood and affect normal. No hallucination or suicidal thoughts.   LYMPHATIC: No cervical, supraclavicular or axillary adenopathy.       RECENT LABS: Independently reviewed and summarized  Hematology WBC   Date Value Ref Range Status   01/08/2020 6.86 3.40 - 10.80 10*3/mm3 Final     RBC   Date Value Ref Range Status   01/08/2020 5.34 4.14 - 5.80 10*6/mm3 Final     Hemoglobin   Date Value Ref Range Status   01/08/2020 15.5 13.0 - 17.7 g/dL Final     Hematocrit   Date Value Ref Range Status   01/08/2020 44.0 37.5 - 51.0 % Final     Platelets   Date Value Ref Range Status   01/08/2020 209 140 - 450 10*3/mm3 Final        Imaging (independently reviewed and summarized):   Patient had CT scan of abdomen pelvis with and without contrast performed on December 9, 2019.  This showed hepatic steatosis.  Showed mildly enlarged spleen measuring 15.3 cm.  Compared to prior CT scan from August 2017 spleen was slightly enlarged.  At that time his spleen was 14.7 cm.  He also had mildly enlarged para-aortic lymph node measuring 15 x 6 mm.  None of these lymph nodes felt to be enlarged pathologically as per CT criteria.    [facility]    Nicholas Negrete reports a pain score of 8.  Given his pain assessment as noted, treatment options were discussed and the following options were decided upon as a follow-up plan to address the patient's pain: referral to Primary Care for assistance in pain treatment guidance.        Diagnosis:   (1) Splenomegaly   (2) Lymphadenopathy   (3) Fatty liver disease  (4) Abdominal pain   (5) Obesity      All are new diagnosis/problems for me.     Assessment/Plan     (1) Splenomegaly (2) Lymphadenopathy (3) Fatty liver disease(4) Abdominal pain     Patient with left-sided abdominal pain which is located in left upper mid and lower quadrant.  As a part of evaluation CT scan of abdomen pelvis was performed which showed mild splenomegaly and  nonpathologically enlarged lymph nodes in the abdomen.  His abdominal pain is not consistent with splenomegaly.  His spleen is mildly enlarged if any.  In addition, given his large body habitus his spleen might be proportionally normal.  In addition, he has fatty liver disease which could cause mildly enlarged spleen.  Lymphadenopathy is not pathological on imaging.  Overall low suspicious for hematological disorder.  His abdominal pain is likely GI or  in origin.  However, we will check flow cytometry, serum protein electrophoresis, immunofixation, free light chains and LDH to rule out hematological disorder.  Discussed with patient that the only definite way to rule out underlying lymphoproliferative disorder is to perform bone marrow aspiration biopsy.  However, we will await the results of laboratory testing.  Might benefit from GI and  referral if hematological work-up comes back negative.      (5) Obesity: Body mass index is 35.15 kg/m².  Counseled about diet, exercise and weight loss.     Thank you for involving me in Mr Negrete's care.     Please call us if any questions/concerns.     Fritz Kim MD   Hematology Oncology

## 2020-01-13 LAB — REF LAB TEST METHOD: NORMAL

## 2020-01-14 LAB
CYTOLOGIST CVX/VAG CYTO: NORMAL
PATH INTERP BLD-IMP: NORMAL

## 2020-01-15 ENCOUNTER — OFFICE VISIT (OUTPATIENT)
Dept: ONCOLOGY | Facility: CLINIC | Age: 55
End: 2020-01-15

## 2020-01-15 VITALS
BODY MASS INDEX: 35.24 KG/M2 | RESPIRATION RATE: 18 BRPM | SYSTOLIC BLOOD PRESSURE: 142 MMHG | TEMPERATURE: 98.7 F | WEIGHT: 238.6 LBS | DIASTOLIC BLOOD PRESSURE: 94 MMHG | HEART RATE: 60 BPM

## 2020-01-15 DIAGNOSIS — R16.1 SPLENOMEGALY: Primary | ICD-10-CM

## 2020-01-15 DIAGNOSIS — R59.1 LYMPHADENOPATHY: ICD-10-CM

## 2020-01-15 DIAGNOSIS — E66.01 CLASS 2 SEVERE OBESITY DUE TO EXCESS CALORIES WITH SERIOUS COMORBIDITY AND BODY MASS INDEX (BMI) OF 35.0 TO 35.9 IN ADULT (HCC): ICD-10-CM

## 2020-01-15 DIAGNOSIS — R10.32 LEFT LOWER QUADRANT ABDOMINAL PAIN: ICD-10-CM

## 2020-01-15 PROCEDURE — G0463 HOSPITAL OUTPT CLINIC VISIT: HCPCS | Performed by: INTERNAL MEDICINE

## 2020-01-15 PROCEDURE — 99214 OFFICE O/P EST MOD 30 MIN: CPT | Performed by: INTERNAL MEDICINE

## 2020-01-15 NOTE — PATIENT INSTRUCTIONS
Bone Marrow Aspiration and Bone Marrow Biopsy, Adult  Bone marrow aspiration and bone marrow biopsy are procedures that are done to diagnose blood disorders. You may also have one of these procedures to help diagnose cancer or certain infections.  Bone marrow is the soft tissue that is inside your bones. Blood cells are produced in bone marrow. For bone marrow aspiration, a sample of tissue in liquid form is removed from inside your bone. For a bone marrow biopsy, a small sample of solid bone marrow tissue is removed. These samples are examined under a microscope or tested in a lab.  You may need these procedures if you have an abnormal complete blood count (CBC). The aspiration or biopsy sample is usually taken from the top of your hip bone. Sometimes, an aspiration sample is taken from your chest bone (sternum).  Tell a health care provider about:  · Any allergies you have.  · All medicines you are taking, including vitamins, herbs, eye drops, creams, and over-the-counter medicines.  · Any problems you or family members have had with anesthetic medicines.  · Any blood or bone disorders you have.  · Any surgeries you have had.  · Any medical conditions you have.  · Whether you are pregnant or may be pregnant.  What are the risks?  Generally, this is a safe procedure. However, problems may occur, including:  · Infection.  · Bleeding.  · Persistent pain after the procedure.  · Cracking (fracture) of the bone.  · Allergic reactions to medicines.  What happens before the procedure?  Staying hydrated  Follow instructions from your health care provider about hydration, which may include:  · Up to 2 hours before the procedure - you may continue to drink clear liquids, such as water, clear fruit juice, black coffee, and plain tea.    Eating and drinking restrictions  Follow instructions from your health care provider about eating and drinking, which may include:  · 8 hours before the procedure - stop eating heavy meals or  foods, such as meat, fried foods, or fatty foods.  · 6 hours before the procedure - stop eating light meals or foods, such as toast or cereal.  · 6 hours before the procedure - stop drinking milk or drinks that contain milk.  · 2 hours before the procedure - stop drinking clear liquids.  Medicines  · Ask your health care provider about:  ? Changing or stopping your regular medicines. This is especially important if you are taking diabetes medicines or blood thinners.  ? Taking medicines such as aspirin and ibuprofen. These medicines can thin your blood. Do not take these medicines unless your health care provider tells you to take them.  ? Taking over-the-counter medicines, vitamins, herbs, and supplements.  General instructions  · Plan to have someone take you home from the hospital or clinic.  · If you will be going home right after the procedure, plan to have someone with you for 24 hours.  · Ask your health care provider how your surgical site will be marked or identified.  · Ask your health care provider what steps will be taken to help prevent infection. These may include:  ? Removing hair at the surgery site.  ? Washing skin with a germ-killing soap.  ? Taking antibiotic medicine.  What happens during the procedure?    · An IV may be inserted into one of your veins.  · You will be given one or more of the following:  ? A medicine to help you relax (sedative).  ? A medicine to numb the area (local anesthetic).  ? A medicine to make you fall asleep (general anesthetic).  · The bone marrow sample will be removed as follows:  ? For an aspiration, a hollow needle will be inserted through your skin and into your bone. Bone marrow fluid will be drawn up into a syringe.  ? For a biopsy, your health care provider will use a hollow needle to remove a small sample of solid tissue from your bone marrow.  · The needle will be removed.  · Bone marrow fluid or tissue will be sent to a lab for examination.  · A bandage  (dressing) will be placed over the insertion site and taped in place.  The procedure may vary among health care providers and hospitals.  What happens after the procedure?  · Your blood pressure, heart rate, breathing rate, and blood oxygen level will be monitored until you leave the hospital or clinic.  · Your IV will be removed, and the insertion site will be checked for bleeding.  · Do not drive for 24 hours if you were given a sedative during your procedure.  Summary  · Bone marrow aspiration and bone marrow biopsy are procedures that are done to diagnose blood disorders. You may also have one of these procedures to help diagnose cancer or certain infections.  · Before the procedure, tell your health care provider about all medicines you are taking, including vitamins, herbs, eye drops, creams, and over-the-counter medicines.  · During the aspiration procedure, a sample of tissue in liquid form is removed from inside your bone. For a bone marrow biopsy, a small sample of solid bone marrow tissue is removed.  · After the procedure, you will be monitored and checked for bleeding.  · Plan to have someone take you home from the hospital or clinic.  This information is not intended to replace advice given to you by your health care provider. Make sure you discuss any questions you have with your health care provider.  Document Released: 12/21/2005 Document Revised: 04/01/2019 Document Reviewed: 05/31/2017  Elsevier Interactive Patient Education © 2019 [employer]

## 2020-01-15 NOTE — PROGRESS NOTES
Nicholas Negrete  [id number]  1965    Subjective     Ms Negrete was seen in follow up for abdominal pain, splenomegaly and lymphadenopathy.   We reviewed the results of laboratory testing which showed no definite evidence of hematological disorder.  He continues to report left-sided abdominal pain which is located in upper mid section of abdomen however does radiate in the lower part of his abdomen.  Pain does interfere with his day-to-day activities.  Patient quite concerned about ongoing pain.    discussed with him that the only definite way to rule out hematological disorder would be to do bone marrow aspiration biopsy.  Discussed the procedure itself and risk associated with bone marrow aspiration biopsy at length.  I gave him option of using local anesthetic versus conscious sedation.  He wants to try bone marrow aspiration biopsy using local anesthetic.  I will make appropriate arrangement.    In addition, I will also arrange referral to gastroenterology to rule out potential GI causes.  If GI work-up is negative he might benefit from urology consultation to rule out  causes.    ROS as below.     Past Medical History, Past Surgical History, Social History, Family History have been reviewed and are without significant changes except as mentioned.    Review of Systems   CONSTITUTIONAL: fatigue +  No weight loss, fever, chills, weakness.  HEENT: Eyes: No visual loss, blurred vision, double vision or yellow sclerae. Ears, Nose, Throat: No hearing loss, sneezing, congestion, runny nose or sore throat.  SKIN: No rash or itching.  CARDIOVASCULAR: No chest pain, chest pressure or chest discomfort. No palpitations or edema.  RESPIRATORY: No shortness of breath, cough or sputum.  GASTROINTESTINAL:abdominal pain +  No anorexia, nausea, vomiting or diarrhea. No  blood.  GENITOURINARY: Negative for urgency, frequency or dysuria.   NEUROLOGICAL: No headache, dizziness, syncope, paralysis, ataxia, numbness or  tingling in the extremities. No change in bowel or bladder control.  MUSCULOSKELETAL: chronic pain + .  HEMATOLOGIC: No anemia, bleeding or bruising.  LYMPHATICS: No enlarged nodes. No history of splenectomy.  PSYCHIATRIC: No history of depression or anxiety.  ENDOCRINOLOGIC: No reports of sweating, cold or heat intolerance. No polyuria or polydipsia.  ALLERGIES: No history of asthma, hives, eczema or rhinitis.    Medications:  The current medication list was reviewed in the EMR    ALLERGIES:    Allergies   Allergen Reactions   • Penicillins Rash       Objective      There were no vitals filed for this visit.  Current Status 1/8/2020   ECOG score 0       Physical Exam      GENERAL: Alert, awake, oriented.  Well dressed.  Not in apparent distress. Vitals as above.   HEAD: Normocephalic, atraumatic.   EYES: PERRL, EOMI. Fundi normal, vision is grossly intact.  NECK: Supple, no adenopathy or thyromegaly.   THROAT: Normal oral cavity and pharynx. No inflammation, swelling, exudate, or lesions.  CARDIAC: Normal S1 and S2. No S3, S4 or murmurs. Rhythm is regular.  Extremities are warm and well perfused.   LUNGS: Clear to auscultation and percussion without rales, rhonchi, wheezing or diminished breath sounds.  ABDOMEN: Positive bowel sounds. Soft, nondistended, nontender. No guarding or rebound. No masses.  BACK:  No bony tenderness.   EXTREMITIES: No significant deformity or joint abnormality.  Peripheral pulses intact. No varicosities.  SKIN: No rash or bruising.  NEUROLOGICAL: Grossly non-focal exam. No focal weakness. Gait: Normal.   PSYCH: Mood and affect normal. No hallucination or suicidal thoughts.   LYMPHATIC: No cervical, supraclavicular or axillary adenopathy.     RECENT LABS: Independently reviewed and summarized  Hematology WBC   Date Value Ref Range Status   01/08/2020 6.86 3.40 - 10.80 10*3/mm3 Final     RBC   Date Value Ref Range Status   01/08/2020 5.34 4.14 - 5.80 10*6/mm3 Final     Hemoglobin   Date  Value Ref Range Status   01/08/2020 15.5 13.0 - 17.7 g/dL Final     Hematocrit   Date Value Ref Range Status   01/08/2020 44.0 37.5 - 51.0 % Final     Platelets   Date Value Ref Range Status   01/08/2020 209 140 - 450 10*3/mm3 Final          Imaging (independently reviewed and summarized):   Patient had CT scan of abdomen pelvis with and without contrast performed on December 9, 2019.  This showed hepatic steatosis.  Showed mildly enlarged spleen measuring 15.3 cm.  Compared to prior CT scan from August 2017 spleen was slightly enlarged.  At that time his spleen was 14.7 cm.  He also had mildly enlarged para-aortic lymph node measuring 15 x 6 mm.  None of these lymph nodes felt to be enlarged pathologically as per CT criteria.     [facility]     Nicholas Negrete reports a pain score of 8.  Given his pain assessment as noted, treatment options were discussed and the following options were decided upon as a follow-up plan to address the patient's pain: referral to Primary Care for assistance in pain treatment guidance.           Diagnosis:   (1) Splenomegaly   (2) Lymphadenopathy   (3) Fatty liver disease  (4) Abdominal pain   (5) Obesity      Assessment/Plan          (1) Splenomegaly (2) Lymphadenopathy (3) Fatty liver disease(4) Abdominal pain      Patient with left-sided abdominal pain which is located in left upper mid and lower quadrant.  As a part of evaluation CT scan of abdomen pelvis was performed which showed mild splenomegaly and nonpathologically enlarged lymph nodes in the abdomen.  His abdominal pain is not consistent with splenomegaly.  His spleen is mildly enlarged if any.  In addition, given his large body habitus his spleen might be proportionally normal.  In addition, he has fatty liver disease which could cause mildly enlarged spleen.  Lymphadenopathy is not pathological on imaging.    LDH, flow cytometry, SPEP, immunofixation, FLC - normal.    Overall suspicious for hematological disorder is low.     Patient is still very concerned about the pain.  I discussed with him that the only definite way to rule out hematological disorder would be to do bone marrow aspiration biopsy.  Discussed the procedure itself and risk associated with bone marrow aspiration biopsy at length.  I gave him option of using local anesthetic versus conscious sedation.  He wants to try bone marrow aspiration biopsy using local anesthetic.  I will make appropriate arrangement.    In addition, I will also arrange referral to gastroenterology to rule out potential GI causes.  If GI work-up is negative he might benefit from urology consultation to rule out  causes.      (5) Obesity: Body mass index is 35.15 kg/m².  Counseled about diet, exercise and weight loss.     1/15/2020      CC:

## 2020-01-30 DIAGNOSIS — R16.1 SPLENOMEGALY: Primary | ICD-10-CM

## 2020-01-31 ENCOUNTER — PROCEDURE VISIT (OUTPATIENT)
Dept: ONCOLOGY | Facility: CLINIC | Age: 55
End: 2020-01-31

## 2020-01-31 ENCOUNTER — LAB (OUTPATIENT)
Dept: ONCOLOGY | Facility: HOSPITAL | Age: 55
End: 2020-01-31

## 2020-01-31 VITALS
TEMPERATURE: 97.5 F | RESPIRATION RATE: 18 BRPM | SYSTOLIC BLOOD PRESSURE: 143 MMHG | DIASTOLIC BLOOD PRESSURE: 82 MMHG | HEART RATE: 97 BPM

## 2020-01-31 DIAGNOSIS — R59.1 LYMPHADENOPATHY: ICD-10-CM

## 2020-01-31 DIAGNOSIS — R16.1 SPLENOMEGALY: ICD-10-CM

## 2020-01-31 DIAGNOSIS — R16.1 SPLENOMEGALY: Primary | ICD-10-CM

## 2020-01-31 LAB
ANISOCYTOSIS BLD QL: ABNORMAL
BASOPHILS # BLD MANUAL: 0.06 10*3/MM3 (ref 0–0.2)
BASOPHILS NFR BLD AUTO: 1 % (ref 0–1.5)
DEPRECATED RDW RBC AUTO: 39 FL (ref 37–54)
ERYTHROCYTE [DISTWIDTH] IN BLOOD BY AUTOMATED COUNT: 13.2 % (ref 12.3–15.4)
HCT VFR BLD AUTO: 43 % (ref 37.5–51)
HGB BLD-MCNC: 15.3 G/DL (ref 13–17.7)
HGB RETIC QN AUTO: 34.5 PG (ref 29.8–36.1)
IMM RETICS NFR: 7.1 % (ref 3–15.8)
LARGE PLATELETS: ABNORMAL
LYMPHOCYTES # BLD MANUAL: 2.32 10*3/MM3 (ref 0.7–3.1)
LYMPHOCYTES NFR BLD MANUAL: 3 % (ref 5–12)
LYMPHOCYTES NFR BLD MANUAL: 38 % (ref 19.6–45.3)
MCH RBC QN AUTO: 29.3 PG (ref 26.6–33)
MCHC RBC AUTO-ENTMCNC: 35.6 G/DL (ref 31.5–35.7)
MCV RBC AUTO: 82.2 FL (ref 79–97)
MONOCYTES # BLD AUTO: 0.18 10*3/MM3 (ref 0.1–0.9)
NEUTROPHILS # BLD AUTO: 3.54 10*3/MM3 (ref 1.7–7)
NEUTROPHILS NFR BLD MANUAL: 58 % (ref 42.7–76)
PLATELET # BLD AUTO: 182 10*3/MM3 (ref 140–450)
PMV BLD AUTO: 10.4 FL (ref 6–12)
RBC # BLD AUTO: 5.23 10*6/MM3 (ref 4.14–5.8)
RETICS/RBC NFR AUTO: 1.73 % (ref 0.7–1.9)
SMALL PLATELETS BLD QL SMEAR: ADEQUATE
WBC MORPH BLD: NORMAL
WBC NRBC COR # BLD: 6.11 10*3/MM3 (ref 3.4–10.8)

## 2020-01-31 PROCEDURE — 85060 BLOOD SMEAR INTERPRETATION: CPT | Performed by: PATHOLOGY

## 2020-01-31 PROCEDURE — 85097 BONE MARROW INTERPRETATION: CPT | Performed by: PATHOLOGY

## 2020-01-31 PROCEDURE — 38221 DX BONE MARROW BIOPSIES: CPT | Performed by: INTERNAL MEDICINE

## 2020-01-31 PROCEDURE — 85025 COMPLETE CBC W/AUTO DIFF WBC: CPT

## 2020-01-31 PROCEDURE — 88305 TISSUE EXAM BY PATHOLOGIST: CPT | Performed by: PATHOLOGY

## 2020-01-31 PROCEDURE — 88341 IMHCHEM/IMCYTCHM EA ADD ANTB: CPT | Performed by: INTERNAL MEDICINE

## 2020-01-31 PROCEDURE — 88342 IMHCHEM/IMCYTCHM 1ST ANTB: CPT | Performed by: PATHOLOGY

## 2020-01-31 PROCEDURE — 88313 SPECIAL STAINS GROUP 2: CPT

## 2020-01-31 PROCEDURE — 88342 IMHCHEM/IMCYTCHM 1ST ANTB: CPT | Performed by: INTERNAL MEDICINE

## 2020-01-31 PROCEDURE — 85046 RETICYTE/HGB CONCENTRATE: CPT

## 2020-01-31 PROCEDURE — 85007 BL SMEAR W/DIFF WBC COUNT: CPT

## 2020-01-31 PROCEDURE — 88305 TISSUE EXAM BY PATHOLOGIST: CPT

## 2020-01-31 PROCEDURE — 88313 SPECIAL STAINS GROUP 2: CPT | Performed by: PATHOLOGY

## 2020-01-31 PROCEDURE — 88311 DECALCIFY TISSUE: CPT

## 2020-01-31 PROCEDURE — 88341 IMHCHEM/IMCYTCHM EA ADD ANTB: CPT | Performed by: PATHOLOGY

## 2020-01-31 PROCEDURE — G0463 HOSPITAL OUTPT CLINIC VISIT: HCPCS | Performed by: INTERNAL MEDICINE

## 2020-01-31 NOTE — PROGRESS NOTES
Nicholas Negrete  [id number]  1965      Date of procedure: 1/31/20     Indication: Splenomegaly        PROCEDURE NOTE:  POSTERIOR ILIAC CREST BONE MARROW ASPIRATE AND BIOPSY     PROCEDURE PERFORMED BY: Khadijah Kim MD      SITE OF BIOPSY: : Left posterior superior iliac spine.      After reviewing with the patient the risks of bone marrow aspiration and biopsy (including but not limited to bleeding, infection, injury to the sciatic nerve, bursitis, and sacroiliac joint inflammation, localized hematoma formation and bruising, and discomfort during the procedure), as well as the benefits of this procedure, verbal consent was obtained to proceed.       The patient was positioned in the prone position. After time out to identify the patient and the correct site of the procedure, the bone marrow site was prepped with topical povidone iodine solution, and a sterile drape was applied.  Using sterile technique, 10 cc of 1% lidocaine was then injected circumferentially around the site to achieve local analgesia.  After a short delay to allow achievement of local analgesia, a 3mm incision with a scalpel blade.  Using a Jamshidi needle with trocar in place, the bone marrow was penetrated and 1-2 cc of marrow was aspirated.  A second aspirate of 6 cc was then obtained for additional studies.  The Jamshidi needle was then repositioned, and with trocar removed, a satisfactory trephine biopsy was then obtained.  The Jamshidi needle was then removed, pressure was applied to the biopsy site, and a sterile bandage was applied.       The patient was then placed in the supine position, and instructed to remain in that position for an additional 15-20 minutes.  Nursing staff was instructed to check the site for bleeding.  The patient was instructed on the aftercare of the bone marrow biopsy site. The procedure was well tolerated, without complications.    I was present with the patient for the duration of moderate  sedation and supervised staff who had no other duties and monitored the patient for the entire procedure.         Fritz Kim MD   Hematology Oncology

## 2020-02-04 LAB
LAB AP ASPIRATE SMEAR: NORMAL
LAB AP CASE REPORT: NORMAL
LAB AP CBC AND DIFFERENTIAL: NORMAL
LAB AP CLINICAL INFORMATION: NORMAL
LAB AP CLOT SECTION: NORMAL
LAB AP CORE BIOPSY: NORMAL
LAB AP FLOW CYTOMETRY SUMMARY: NORMAL
LAB AP SPECIAL STAINS: NORMAL
PATH REPORT.FINAL DX SPEC: NORMAL

## 2020-02-07 ENCOUNTER — OFFICE VISIT (OUTPATIENT)
Dept: GASTROENTEROLOGY | Facility: CLINIC | Age: 55
End: 2020-02-07

## 2020-02-07 VITALS
BODY MASS INDEX: 34.93 KG/M2 | SYSTOLIC BLOOD PRESSURE: 130 MMHG | DIASTOLIC BLOOD PRESSURE: 80 MMHG | HEIGHT: 69 IN | OXYGEN SATURATION: 99 % | WEIGHT: 235.8 LBS | HEART RATE: 93 BPM

## 2020-02-07 DIAGNOSIS — R19.4 CHANGE IN BOWEL HABITS: ICD-10-CM

## 2020-02-07 DIAGNOSIS — R10.13 EPIGASTRIC PAIN: Primary | ICD-10-CM

## 2020-02-07 PROCEDURE — 99214 OFFICE O/P EST MOD 30 MIN: CPT | Performed by: INTERNAL MEDICINE

## 2020-02-07 RX ORDER — DEXTROSE AND SODIUM CHLORIDE 5; .45 G/100ML; G/100ML
30 INJECTION, SOLUTION INTRAVENOUS CONTINUOUS PRN
Status: CANCELLED | OUTPATIENT
Start: 2020-02-24

## 2020-02-07 RX ORDER — SODIUM, POTASSIUM,MAG SULFATES 17.5-3.13G
1 SOLUTION, RECONSTITUTED, ORAL ORAL EVERY 12 HOURS
Qty: 1 BOTTLE | Refills: 0 | Status: SHIPPED | OUTPATIENT
Start: 2020-02-07 | End: 2020-02-24 | Stop reason: HOSPADM

## 2020-02-07 NOTE — H&P (VIEW-ONLY)
[facility]      Chief Complaint:   Chief Complaint   Patient presents with   • Abdominal Pain       Subjective     HPI:   Patient with epigastric abdominal pain left upper quadrant abdominal pain.  Patient was found to have splenomegaly is currently being evaluated by hematology oncology for this.  Patient recently underwent a bone marrow biopsy.  Patient states pain becomes worse with eating also has change in bowel habits and some improvement in pain after bowel movements.    Plan; we will schedule patient for EGD and colonoscopy to evaluate.  Determine if any lesions found that could be causing patient symptoms.  Discussed with patient it is possible splenomegaly can cause pain in the left upper quadrant and this may could be the cause of his symptoms but endoscopy will rule out other causes    Past Medical History:   Past Medical History:   Diagnosis Date   • Anxiety    • Arthritis    • Back pain    • Fibromyalgia    • GERD (gastroesophageal reflux disease)    • Hemorrhoids    • Hx of heartburn    • Hypertension    • Pain     hip, joint, neck, knee, back    • Problems with hearing    • Sleep apnea    • Sleep apnea     uses Cpap/Bipap    • Splenomegaly        Past Surgical History:  Past Surgical History:   Procedure Laterality Date   • BACK SURGERY      L4 and L5 fused   • BACK SURGERY  2011   • COLONOSCOPY N/A 10/27/2017    Procedure: COLONOSCOPY;  Surgeon: Tai Hayes MD;  Location: [facility] ENDOSCOPY;  Service:    • REPLACEMENT TOTAL KNEE Left 02/2019       Family History:  Family History   Problem Relation Age of Onset   • Heart attack Father    • Heart disease Mother        Social History:   reports that he has never smoked. He has never used smokeless tobacco. He reports that he drinks about 1.0 standard drinks of alcohol per week. He reports that he does not use drugs.    Medications:   Prior to Admission medications    Medication Sig Start Date End Date Taking? Authorizing  "Provider   Pregabalin (LYRICA PO) Take 150 mg by mouth Daily.   Yes Desirae Maier MD   aspirin 81 MG chewable tablet Chew 81 mg Daily.    ProviderDesirae MD   OMEPRAZOLE PO Take 20 mg by mouth As Needed.    ProviderDesirae MD   sodium-potassium-magnesium sulfates (SUPREP) 17.5-3.13-1.6 GM/177ML solution oral solution Take 1 bottle by mouth Every 12 (Twelve) Hours. 2/7/20   Tai Hayes MD   zolpidem (AMBIEN) 10 MG tablet Take 5 mg by mouth Daily.    ProviderDesirae MD       Allergies:  Penicillins    ROS:    Review of Systems   Constitutional: Negative for activity change, appetite change and unexpected weight change.   HENT: Negative for congestion, sore throat and trouble swallowing.    Respiratory: Negative for cough, choking and shortness of breath.    Cardiovascular: Negative for chest pain.   Gastrointestinal: Positive for abdominal pain. Negative for abdominal distention, anal bleeding, blood in stool, constipation, diarrhea, nausea, rectal pain and vomiting.   Endocrine: Negative for heat intolerance, polydipsia and polyphagia.   Genitourinary: Negative for difficulty urinating.   Musculoskeletal: Negative for arthralgias.   Skin: Negative for color change, pallor, rash and wound.   Allergic/Immunologic: Negative for food allergies.   Neurological: Negative for dizziness, syncope, weakness and headaches.   Psychiatric/Behavioral: Negative for agitation, behavioral problems, confusion and decreased concentration.     Objective     Blood pressure 130/80, pulse 93, height 175.3 cm (69\"), weight 107 kg (235 lb 12.8 oz), SpO2 99 %.    Physical Exam   Constitutional: He is oriented to person, place, and time. He appears well-developed and well-nourished. No distress.   HENT:   Head: Normocephalic and atraumatic.   Cardiovascular: Normal rate, regular rhythm, normal heart sounds and intact distal pulses. Exam reveals no gallop and no friction rub.   No murmur heard.  Pulmonary/Chest: " Breath sounds normal. No respiratory distress. He has no wheezes. He has no rales. He exhibits no tenderness.   Abdominal: Soft. Bowel sounds are normal. He exhibits no distension and no mass. There is no tenderness. There is no rebound and no guarding. No hernia.   Musculoskeletal: Normal range of motion. He exhibits no edema.   Neurological: He is alert and oriented to person, place, and time.   Skin: Skin is warm and dry. No rash noted. He is not diaphoretic. No erythema. No pallor.   Psychiatric: He has a normal mood and affect. His behavior is normal. Judgment and thought content normal.        Assessment/Plan   Nicholas was seen today for abdominal pain.    Diagnoses and all orders for this visit:    Epigastric pain  -     Case Request; Standing  -     Case Request    Change in bowel habits  -     Case Request; Standing  -     Case Request    Other orders  -     Follow Anesthesia Guidelines / Standing Orders; Future  -     Obtain Informed Consent; Future  -     sodium-potassium-magnesium sulfates (SUPREP) 17.5-3.13-1.6 GM/177ML solution oral solution; Take 1 bottle by mouth Every 12 (Twelve) Hours.        ESOPHAGOGASTRODUODENOSCOPY (N/A), COLONOSCOPY (N/A)     Diagnosis Plan   1. Epigastric pain  Case Request    dextrose 5 % and sodium chloride 0.45 % infusion    Case Request   2. Change in bowel habits  Case Request    dextrose 5 % and sodium chloride 0.45 % infusion    Case Request       Anticipated Surgical Procedure:  Orders Placed This Encounter   Procedures   • Follow Anesthesia Guidelines / Standing Orders     Standing Status:   Future   • Obtain Informed Consent     Standing Status:   Future     Order Specific Question:   Informed Consent Given For     Answer:   egd and colonoscopy       The risks, benefits, and alternatives of this procedure have been discussed with the patient or the responsible party- the patient understands and agrees to  proceed.

## 2020-02-07 NOTE — PROGRESS NOTES
[facility]      Chief Complaint:   Chief Complaint   Patient presents with   • Abdominal Pain       Subjective     HPI:   Patient with epigastric abdominal pain left upper quadrant abdominal pain.  Patient was found to have splenomegaly is currently being evaluated by hematology oncology for this.  Patient recently underwent a bone marrow biopsy.  Patient states pain becomes worse with eating also has change in bowel habits and some improvement in pain after bowel movements.    Plan; we will schedule patient for EGD and colonoscopy to evaluate.  Determine if any lesions found that could be causing patient symptoms.  Discussed with patient it is possible splenomegaly can cause pain in the left upper quadrant and this may could be the cause of his symptoms but endoscopy will rule out other causes    Past Medical History:   Past Medical History:   Diagnosis Date   • Anxiety    • Arthritis    • Back pain    • Fibromyalgia    • GERD (gastroesophageal reflux disease)    • Hemorrhoids    • Hx of heartburn    • Hypertension    • Pain     hip, joint, neck, knee, back    • Problems with hearing    • Sleep apnea    • Sleep apnea     uses Cpap/Bipap    • Splenomegaly        Past Surgical History:  Past Surgical History:   Procedure Laterality Date   • BACK SURGERY      L4 and L5 fused   • BACK SURGERY  2011   • COLONOSCOPY N/A 10/27/2017    Procedure: COLONOSCOPY;  Surgeon: Tai Hayes MD;  Location: [facility] ENDOSCOPY;  Service:    • REPLACEMENT TOTAL KNEE Left 02/2019       Family History:  Family History   Problem Relation Age of Onset   • Heart attack Father    • Heart disease Mother        Social History:   reports that he has never smoked. He has never used smokeless tobacco. He reports that he drinks about 1.0 standard drinks of alcohol per week. He reports that he does not use drugs.    Medications:   Prior to Admission medications    Medication Sig Start Date End Date Taking? Authorizing  "Provider   Pregabalin (LYRICA PO) Take 150 mg by mouth Daily.   Yes Desirae Maier MD   aspirin 81 MG chewable tablet Chew 81 mg Daily.    ProviderDesirae MD   OMEPRAZOLE PO Take 20 mg by mouth As Needed.    ProviderDesirae MD   sodium-potassium-magnesium sulfates (SUPREP) 17.5-3.13-1.6 GM/177ML solution oral solution Take 1 bottle by mouth Every 12 (Twelve) Hours. 2/7/20   Tai Hayes MD   zolpidem (AMBIEN) 10 MG tablet Take 5 mg by mouth Daily.    ProviderDesirae MD       Allergies:  Penicillins    ROS:    Review of Systems   Constitutional: Negative for activity change, appetite change and unexpected weight change.   HENT: Negative for congestion, sore throat and trouble swallowing.    Respiratory: Negative for cough, choking and shortness of breath.    Cardiovascular: Negative for chest pain.   Gastrointestinal: Positive for abdominal pain. Negative for abdominal distention, anal bleeding, blood in stool, constipation, diarrhea, nausea, rectal pain and vomiting.   Endocrine: Negative for heat intolerance, polydipsia and polyphagia.   Genitourinary: Negative for difficulty urinating.   Musculoskeletal: Negative for arthralgias.   Skin: Negative for color change, pallor, rash and wound.   Allergic/Immunologic: Negative for food allergies.   Neurological: Negative for dizziness, syncope, weakness and headaches.   Psychiatric/Behavioral: Negative for agitation, behavioral problems, confusion and decreased concentration.     Objective     Blood pressure 130/80, pulse 93, height 175.3 cm (69\"), weight 107 kg (235 lb 12.8 oz), SpO2 99 %.    Physical Exam   Constitutional: He is oriented to person, place, and time. He appears well-developed and well-nourished. No distress.   HENT:   Head: Normocephalic and atraumatic.   Cardiovascular: Normal rate, regular rhythm, normal heart sounds and intact distal pulses. Exam reveals no gallop and no friction rub.   No murmur heard.  Pulmonary/Chest: " Breath sounds normal. No respiratory distress. He has no wheezes. He has no rales. He exhibits no tenderness.   Abdominal: Soft. Bowel sounds are normal. He exhibits no distension and no mass. There is no tenderness. There is no rebound and no guarding. No hernia.   Musculoskeletal: Normal range of motion. He exhibits no edema.   Neurological: He is alert and oriented to person, place, and time.   Skin: Skin is warm and dry. No rash noted. He is not diaphoretic. No erythema. No pallor.   Psychiatric: He has a normal mood and affect. His behavior is normal. Judgment and thought content normal.        Assessment/Plan   Nicholas was seen today for abdominal pain.    Diagnoses and all orders for this visit:    Epigastric pain  -     Case Request; Standing  -     Case Request    Change in bowel habits  -     Case Request; Standing  -     Case Request    Other orders  -     Follow Anesthesia Guidelines / Standing Orders; Future  -     Obtain Informed Consent; Future  -     sodium-potassium-magnesium sulfates (SUPREP) 17.5-3.13-1.6 GM/177ML solution oral solution; Take 1 bottle by mouth Every 12 (Twelve) Hours.        ESOPHAGOGASTRODUODENOSCOPY (N/A), COLONOSCOPY (N/A)     Diagnosis Plan   1. Epigastric pain  Case Request    dextrose 5 % and sodium chloride 0.45 % infusion    Case Request   2. Change in bowel habits  Case Request    dextrose 5 % and sodium chloride 0.45 % infusion    Case Request       Anticipated Surgical Procedure:  Orders Placed This Encounter   Procedures   • Follow Anesthesia Guidelines / Standing Orders     Standing Status:   Future   • Obtain Informed Consent     Standing Status:   Future     Order Specific Question:   Informed Consent Given For     Answer:   egd and colonoscopy       The risks, benefits, and alternatives of this procedure have been discussed with the patient or the responsible party- the patient understands and agrees to  proceed.

## 2020-02-07 NOTE — PATIENT INSTRUCTIONS

## 2020-02-10 LAB — KARYOTYP MAR: NORMAL

## 2020-02-14 ENCOUNTER — OFFICE VISIT (OUTPATIENT)
Dept: ONCOLOGY | Facility: CLINIC | Age: 55
End: 2020-02-14

## 2020-02-14 VITALS
WEIGHT: 233.8 LBS | DIASTOLIC BLOOD PRESSURE: 67 MMHG | SYSTOLIC BLOOD PRESSURE: 133 MMHG | TEMPERATURE: 97.9 F | HEART RATE: 76 BPM | BODY MASS INDEX: 34.63 KG/M2 | HEIGHT: 69 IN | RESPIRATION RATE: 18 BRPM

## 2020-02-14 DIAGNOSIS — R10.13 EPIGASTRIC PAIN: ICD-10-CM

## 2020-02-14 DIAGNOSIS — R16.1 SPLENOMEGALY: Primary | ICD-10-CM

## 2020-02-14 DIAGNOSIS — R19.4 CHANGE IN BOWEL HABITS: ICD-10-CM

## 2020-02-14 PROCEDURE — G0463 HOSPITAL OUTPT CLINIC VISIT: HCPCS | Performed by: INTERNAL MEDICINE

## 2020-02-14 PROCEDURE — 99213 OFFICE O/P EST LOW 20 MIN: CPT | Performed by: INTERNAL MEDICINE

## 2020-02-14 NOTE — PROGRESS NOTES
Nicholas Negrete  [id number]  1965    Subjective     Ms Negrete was seen in follow up for abdominal pain, splenomegaly and lymphadenopathy.     Result of bone marrow aspiration biopsy discussed with the patient.  Showed no evidence of hematological disorder.  Discussed with patient again that I do not believe that his splenomegaly is causing his pain.  He was evaluated by gastroenterology and scheduled for upper and lower endoscopies.  We will see him back in 4 months and arrange for ultrasound spleen to measure the spleen size.  No definite evidence of any hematological disorder at this point.    ROS as below.     Past Medical History, Past Surgical History, Social History, Family History have been reviewed and are without significant changes except as mentioned.    Review of Systems   CONSTITUTIONAL: fatigue +  No weight loss, fever, chills, weakness.  HEENT: Eyes: No visual loss, blurred vision, double vision or yellow sclerae. Ears, Nose, Throat: No hearing loss, sneezing, congestion, runny nose or sore throat.  SKIN: No rash or itching.  CARDIOVASCULAR: No chest pain, chest pressure or chest discomfort. No palpitations or edema.  RESPIRATORY: No shortness of breath, cough or sputum.  GASTROINTESTINAL:abdominal pain +  No anorexia, nausea, vomiting or diarrhea. No  blood.  GENITOURINARY: Negative for urgency, frequency or dysuria.   NEUROLOGICAL: No headache, dizziness, syncope, paralysis, ataxia, numbness or tingling in the extremities. No change in bowel or bladder control.  MUSCULOSKELETAL: chronic pain + .  HEMATOLOGIC: No anemia, bleeding or bruising.  LYMPHATICS: No enlarged nodes. No history of splenectomy.  PSYCHIATRIC: No history of depression or anxiety.  ENDOCRINOLOGIC: No reports of sweating, cold or heat intolerance. No polyuria or polydipsia.  ALLERGIES: No history of asthma, hives, eczema or rhinitis.    Medications:  The current medication list was reviewed in the EMR    ALLERGIES:   "  Allergies   Allergen Reactions   • Penicillins Rash       Objective      Vitals:    02/14/20 1419   BP: 133/67   Pulse: 76   Resp: 18   Temp: 97.9 °F (36.6 °C)   TempSrc: Temporal   Weight: 106 kg (233 lb 12.8 oz)   Height: 175.3 cm (69.02\")   PainSc:   8   PainLoc: Generalized     Current Status 2/14/2020   ECOG score 0       Physical Exam      GENERAL: Alert, awake, oriented.  Well dressed.  Not in apparent distress. Vitals as above.   HEAD: Normocephalic, atraumatic.   EYES: PERRL, EOMI. Fundi normal, vision is grossly intact.  NECK: Supple, no adenopathy or thyromegaly.   THROAT: Normal oral cavity and pharynx. No inflammation, swelling, exudate, or lesions.  CARDIAC: Normal S1 and S2. No S3, S4 or murmurs. Rhythm is regular.  Extremities are warm and well perfused.   LUNGS: Clear to auscultation and percussion without rales, rhonchi, wheezing or diminished breath sounds.  ABDOMEN: Positive bowel sounds. Soft, nondistended, nontender. No guarding or rebound. No masses.  BACK:  No bony tenderness.   EXTREMITIES: No significant deformity or joint abnormality.  Peripheral pulses intact. No varicosities.  SKIN: No rash or bruising.  NEUROLOGICAL: Grossly non-focal exam. No focal weakness. Gait: Normal.   PSYCH: Mood and affect normal. No hallucination or suicidal thoughts.   LYMPHATIC: No cervical, supraclavicular or axillary adenopathy.     RECENT LABS: Independently reviewed and summarized  Hematology WBC   Date Value Ref Range Status   01/31/2020 6.11 3.40 - 10.80 10*3/mm3 Final     RBC   Date Value Ref Range Status   01/31/2020 5.23 4.14 - 5.80 10*6/mm3 Final     Hemoglobin   Date Value Ref Range Status   01/31/2020 15.3 13.0 - 17.7 g/dL Final     Hematocrit   Date Value Ref Range Status   01/31/2020 43.0 37.5 - 51.0 % Final     Platelets   Date Value Ref Range Status   01/31/2020 182 140 - 450 10*3/mm3 Final          Imaging (independently reviewed and summarized):   Patient had CT scan of abdomen pelvis with " and without contrast performed on December 9, 2019.  This showed hepatic steatosis.  Showed mildly enlarged spleen measuring 15.3 cm.  Compared to prior CT scan from August 2017 spleen was slightly enlarged.  At that time his spleen was 14.7 cm.  He also had mildly enlarged para-aortic lymph node measuring 15 x 6 mm.  None of these lymph nodes felt to be enlarged pathologically as per CT criteria.    Bone marrow aspiration/biopsy (1/31/20)      Bone Marrow Exam  Final result 1/31/2020   Final Diagnosis   PERIPHERAL SMEAR, REVIEW:  NO SIGNIFICANT MORPHOLOGIC ABNORMALITY.     BONE MARROW ASPIRATE, CLOT, AND BIOPSY, LEFT POSTERIOR ILIAC CREST:  ERYTHROID HYPERPLASIA.  NEGATIVE FOR MORPHOLOGIC EVIDENCE OF LYMPHOMA.                [facility]     Nicholas Negrete reports a pain score of 8.  Given his pain assessment as noted, treatment options were discussed and the following options were decided upon as a follow-up plan to address the patient's pain: referral to Primary Care for assistance in pain treatment guidance.           Diagnosis:   (1) Splenomegaly   (2) Lymphadenopathy   (3) Fatty liver disease  (4) Abdominal pain   (5) Obesity      Assessment/Plan          (1) Splenomegaly (2) Lymphadenopathy (3) Fatty liver disease(4) Abdominal pain      Result of bone marrow aspiration biopsy discussed with the patient.  Showed no evidence of hematological disorder.  Discussed with patient again that I do not believe that his splenomegaly is causing his pain.  He was evaluated by gastroenterology and scheduled for upper and lower endoscopies.  We will see him back in 4 months and arrange for ultrasound spleen to measure the spleen size.  No definite evidence of any hematological disorder at this point.      (5) Obesity: Body mass index is 35.15 kg/m².  Counseled about diet, exercise and weight loss.     2/14/2020      CC:

## 2020-02-17 LAB — REF LAB TEST METHOD: NORMAL

## 2020-02-24 ENCOUNTER — HOSPITAL ENCOUNTER (OUTPATIENT)
Facility: HOSPITAL | Age: 55
Setting detail: HOSPITAL OUTPATIENT SURGERY
Discharge: HOME | End: 2020-02-24
Attending: INTERNAL MEDICINE | Admitting: INTERNAL MEDICINE

## 2020-02-24 ENCOUNTER — ANESTHESIA EVENT (OUTPATIENT)
Dept: GASTROENTEROLOGY | Facility: HOSPITAL | Age: 55
End: 2020-02-24

## 2020-02-24 ENCOUNTER — ANESTHESIA (OUTPATIENT)
Dept: GASTROENTEROLOGY | Facility: HOSPITAL | Age: 55
End: 2020-02-24

## 2020-02-24 VITALS
SYSTOLIC BLOOD PRESSURE: 126 MMHG | BODY MASS INDEX: 34.1 KG/M2 | WEIGHT: 231 LBS | DIASTOLIC BLOOD PRESSURE: 79 MMHG | OXYGEN SATURATION: 97 % | HEART RATE: 70 BPM | RESPIRATION RATE: 18 BRPM | TEMPERATURE: 97.3 F

## 2020-02-24 DIAGNOSIS — R19.4 CHANGE IN BOWEL HABITS: ICD-10-CM

## 2020-02-24 DIAGNOSIS — R10.13 EPIGASTRIC PAIN: ICD-10-CM

## 2020-02-24 PROCEDURE — 25010000002 PROPOFOL 10 MG/ML EMULSION: Performed by: NURSE ANESTHETIST, CERTIFIED REGISTERED

## 2020-02-24 PROCEDURE — 88305 TISSUE EXAM BY PATHOLOGIST: CPT | Performed by: PATHOLOGY

## 2020-02-24 PROCEDURE — 45385 COLONOSCOPY W/LESION REMOVAL: CPT | Performed by: INTERNAL MEDICINE

## 2020-02-24 PROCEDURE — 45380 COLONOSCOPY AND BIOPSY: CPT | Performed by: INTERNAL MEDICINE

## 2020-02-24 PROCEDURE — 43239 EGD BIOPSY SINGLE/MULTIPLE: CPT | Performed by: INTERNAL MEDICINE

## 2020-02-24 PROCEDURE — 88305 TISSUE EXAM BY PATHOLOGIST: CPT | Performed by: INTERNAL MEDICINE

## 2020-02-24 RX ORDER — LIDOCAINE HYDROCHLORIDE 20 MG/ML
INJECTION, SOLUTION INTRAVENOUS AS NEEDED
Status: DISCONTINUED | OUTPATIENT
Start: 2020-02-24 | End: 2020-02-24 | Stop reason: SURG

## 2020-02-24 RX ORDER — DEXTROSE AND SODIUM CHLORIDE 5; .45 G/100ML; G/100ML
30 INJECTION, SOLUTION INTRAVENOUS CONTINUOUS PRN
Status: DISCONTINUED | OUTPATIENT
Start: 2020-02-24 | End: 2020-02-24 | Stop reason: HOSPADM

## 2020-02-24 RX ORDER — PROPOFOL 10 MG/ML
VIAL (ML) INTRAVENOUS AS NEEDED
Status: DISCONTINUED | OUTPATIENT
Start: 2020-02-24 | End: 2020-02-24 | Stop reason: SURG

## 2020-02-24 RX ADMIN — DEXTROSE AND SODIUM CHLORIDE 30 ML/HR: 5; 450 INJECTION, SOLUTION INTRAVENOUS at 08:30

## 2020-02-24 RX ADMIN — PROPOFOL 100 MG: 10 INJECTION, EMULSION INTRAVENOUS at 09:53

## 2020-02-24 RX ADMIN — LIDOCAINE HYDROCHLORIDE 100 MG: 20 INJECTION, SOLUTION INTRAVENOUS at 09:53

## 2020-02-24 RX ADMIN — PROPOFOL 20 MG: 10 INJECTION, EMULSION INTRAVENOUS at 10:08

## 2020-02-24 RX ADMIN — PROPOFOL 40 MG: 10 INJECTION, EMULSION INTRAVENOUS at 10:05

## 2020-02-24 RX ADMIN — PROPOFOL 50 MG: 10 INJECTION, EMULSION INTRAVENOUS at 10:01

## 2020-02-24 RX ADMIN — PROPOFOL 20 MG: 10 INJECTION, EMULSION INTRAVENOUS at 09:55

## 2020-02-24 RX ADMIN — PROPOFOL 20 MG: 10 INJECTION, EMULSION INTRAVENOUS at 09:57

## 2020-02-24 NOTE — ANESTHESIA POSTPROCEDURE EVALUATION
Patient: Nicholas Negrete    Procedure Summary     Date:  02/24/20 Room / Location:  [facility] ENDOSCOPY 1 / [facility] ENDOSCOPY    Anesthesia Start:  0951 Anesthesia Stop:  1010    Procedures:       ESOPHAGOGASTRODUODENOSCOPY (N/A )      COLONOSCOPY (N/A ) Diagnosis:       Epigastric pain      Change in bowel habits      (Epigastric pain [R10.13])      (Change in bowel habits [R19.4])    Surgeon:  Tai Hayes MD Provider:  Inga Peralta CRNA    Anesthesia Type:  MAC ASA Status:  2          Anesthesia Type: MAC    Vitals  No vitals data found for the desired time range.          Post Anesthesia Care and Evaluation    Patient location during evaluation: bedside  Patient participation: complete - patient participated  Level of consciousness: awake and awake and alert  Pain score: 0  Pain management: satisfactory to patient  Airway patency: patent  Anesthetic complications: No anesthetic complications  PONV Status: none  Cardiovascular status: acceptable and stable  Respiratory status: acceptable, room air and spontaneous ventilation  Hydration status: acceptable

## 2020-02-24 NOTE — ANESTHESIA PREPROCEDURE EVALUATION
Anesthesia Evaluation     Patient summary reviewed and Nursing notes reviewed   NPO Solid Status: > 8 hours  NPO Liquid Status: > 2 hours           Airway   Mallampati: III  TM distance: <3 FB  Neck ROM: full  difficult intubation highly probable  Dental          Pulmonary - normal exam   (+) sleep apnea on CPAP,   Cardiovascular - normal exam    (+) hypertension,       Neuro/Psych  (+) psychiatric history Anxiety,     GI/Hepatic/Renal/Endo    (+) obesity,  GERD,      Musculoskeletal     (+) back pain, chronic pain, myalgias,   Abdominal  - normal exam   Substance History   (+) alcohol use,      OB/GYN          Other   arthritis,                        Anesthesia Plan    ASA 2     MAC     intravenous induction     Anesthetic plan, all risks, benefits, and alternatives have been provided, discussed and informed consent has been obtained with: patient.

## 2020-02-25 LAB
LAB AP CASE REPORT: NORMAL
PATH REPORT.FINAL DX SPEC: NORMAL
PATH REPORT.GROSS SPEC: NORMAL

## 2020-03-01 ENCOUNTER — APPOINTMENT (OUTPATIENT)
Dept: CT IMAGING | Facility: HOSPITAL | Age: 55
End: 2020-03-01

## 2020-03-01 ENCOUNTER — HOSPITAL ENCOUNTER (EMERGENCY)
Facility: HOSPITAL | Age: 55
Discharge: HOME | End: 2020-03-01
Attending: FAMILY MEDICINE | Admitting: FAMILY MEDICINE

## 2020-03-01 VITALS
OXYGEN SATURATION: 96 % | HEART RATE: 115 BPM | DIASTOLIC BLOOD PRESSURE: 64 MMHG | TEMPERATURE: 99.3 F | HEIGHT: 70 IN | RESPIRATION RATE: 18 BRPM | SYSTOLIC BLOOD PRESSURE: 116 MMHG | BODY MASS INDEX: 32.64 KG/M2 | WEIGHT: 228 LBS

## 2020-03-01 DIAGNOSIS — A08.11 NOROVIRUS: Primary | ICD-10-CM

## 2020-03-01 LAB
ADV 40+41 DNA STL QL NAA+NON-PROBE: NOT DETECTED
ALBUMIN SERPL-MCNC: 4.8 G/DL (ref 3.5–5.2)
ALBUMIN/GLOB SERPL: 1.5 G/DL
ALP SERPL-CCNC: 86 U/L (ref 39–117)
ALT SERPL W P-5'-P-CCNC: 25 U/L (ref 1–41)
AMYLASE SERPL-CCNC: 89 U/L (ref 28–100)
ANION GAP SERPL CALCULATED.3IONS-SCNC: 17 MMOL/L (ref 5–15)
AST SERPL-CCNC: 20 U/L (ref 1–40)
ASTRO TYP 1-8 RNA STL QL NAA+NON-PROBE: NOT DETECTED
BASOPHILS # BLD AUTO: 0.03 10*3/MM3 (ref 0–0.2)
BASOPHILS NFR BLD AUTO: 0.4 % (ref 0–1.5)
BILIRUB SERPL-MCNC: 0.7 MG/DL (ref 0.2–1.2)
BILIRUB UR QL STRIP: NEGATIVE
BUN BLD-MCNC: 14 MG/DL (ref 6–20)
BUN/CREAT SERPL: 11.8 (ref 7–25)
C CAYETANENSIS DNA STL QL NAA+NON-PROBE: NOT DETECTED
C DIFF TOX GENS STL QL NAA+PROBE: NEGATIVE
CALCIUM SPEC-SCNC: 9.9 MG/DL (ref 8.6–10.5)
CAMPY SP DNA.DIARRHEA STL QL NAA+PROBE: NOT DETECTED
CHLORIDE SERPL-SCNC: 103 MMOL/L (ref 98–107)
CLARITY UR: CLEAR
CO2 SERPL-SCNC: 22 MMOL/L (ref 22–29)
COLOR UR: YELLOW
CREAT BLD-MCNC: 1.19 MG/DL (ref 0.76–1.27)
CRYPTOSP STL CULT: NOT DETECTED
DEPRECATED RDW RBC AUTO: 40.3 FL (ref 37–54)
E COLI DNA SPEC QL NAA+PROBE: NOT DETECTED
E HISTOLYT AG STL-ACNC: NOT DETECTED
EAEC PAA PLAS AGGR+AATA ST NAA+NON-PRB: NOT DETECTED
EC STX1 + STX2 GENES STL NAA+PROBE: NOT DETECTED
EOSINOPHIL # BLD AUTO: 0.07 10*3/MM3 (ref 0–0.4)
EOSINOPHIL NFR BLD AUTO: 0.8 % (ref 0.3–6.2)
EPEC EAE GENE STL QL NAA+NON-PROBE: NOT DETECTED
ERYTHROCYTE [DISTWIDTH] IN BLOOD BY AUTOMATED COUNT: 13.9 % (ref 12.3–15.4)
ETEC LTA+ST1A+ST1B TOX ST NAA+NON-PROBE: NOT DETECTED
G LAMBLIA DNA SPEC QL NAA+PROBE: NOT DETECTED
GFR SERPL CREATININE-BSD FRML MDRD: 64 ML/MIN/1.73
GLOBULIN UR ELPH-MCNC: 3.1 GM/DL
GLUCOSE BLD-MCNC: 144 MG/DL (ref 65–99)
GLUCOSE UR STRIP-MCNC: NEGATIVE MG/DL
HCT VFR BLD AUTO: 46.5 % (ref 37.5–51)
HEMOCCULT STL QL: POSITIVE
HGB BLD-MCNC: 16.3 G/DL (ref 13–17.7)
HGB UR QL STRIP.AUTO: NEGATIVE
HOLD SPECIMEN: NORMAL
IMM GRANULOCYTES # BLD AUTO: 0.04 10*3/MM3 (ref 0–0.05)
IMM GRANULOCYTES NFR BLD AUTO: 0.5 % (ref 0–0.5)
KETONES UR QL STRIP: NEGATIVE
LEUKOCYTE ESTERASE UR QL STRIP.AUTO: NEGATIVE
LIPASE SERPL-CCNC: 35 U/L (ref 13–60)
LYMPHOCYTES # BLD AUTO: 0.38 10*3/MM3 (ref 0.7–3.1)
LYMPHOCYTES NFR BLD AUTO: 4.6 % (ref 19.6–45.3)
MCH RBC QN AUTO: 28.8 PG (ref 26.6–33)
MCHC RBC AUTO-ENTMCNC: 35.1 G/DL (ref 31.5–35.7)
MCV RBC AUTO: 82.3 FL (ref 79–97)
MONOCYTES # BLD AUTO: 0.37 10*3/MM3 (ref 0.1–0.9)
MONOCYTES NFR BLD AUTO: 4.4 % (ref 5–12)
NEUTROPHILS # BLD AUTO: 7.45 10*3/MM3 (ref 1.7–7)
NEUTROPHILS NFR BLD AUTO: 89.3 % (ref 42.7–76)
NITRITE UR QL STRIP: NEGATIVE
NOROVIRUS GI+II RNA STL QL NAA+NON-PROBE: DETECTED
NRBC BLD AUTO-RTO: 0 /100 WBC (ref 0–0.2)
P SHIGELLOIDES DNA STL QL NAA+PROBE: NOT DETECTED
PH UR STRIP.AUTO: <=5 [PH] (ref 5–9)
PLATELET # BLD AUTO: 152 10*3/MM3 (ref 140–450)
PMV BLD AUTO: 10.6 FL (ref 6–12)
POTASSIUM BLD-SCNC: 4.2 MMOL/L (ref 3.5–5.2)
PROT SERPL-MCNC: 7.9 G/DL (ref 6–8.5)
PROT UR QL STRIP: NEGATIVE
RBC # BLD AUTO: 5.65 10*6/MM3 (ref 4.14–5.8)
RV RNA STL NAA+PROBE: NOT DETECTED
SALMONELLA DNA SPEC QL NAA+PROBE: NOT DETECTED
SAPO I+II+IV+V RNA STL QL NAA+NON-PROBE: NOT DETECTED
SHIGELLA SP+EIEC IPAH STL QL NAA+PROBE: NOT DETECTED
SODIUM BLD-SCNC: 142 MMOL/L (ref 136–145)
SP GR UR STRIP: 1.04 (ref 1–1.03)
UROBILINOGEN UR QL STRIP: ABNORMAL
V CHOLERAE DNA SPEC QL NAA+PROBE: NOT DETECTED
VIBRIO DNA SPEC NAA+PROBE: NOT DETECTED
WBC NRBC COR # BLD: 8.34 10*3/MM3 (ref 3.4–10.8)
WHOLE BLOOD HOLD SPECIMEN: NORMAL
YERSINIA STL CULT: NOT DETECTED

## 2020-03-01 PROCEDURE — 96374 THER/PROPH/DIAG INJ IV PUSH: CPT

## 2020-03-01 PROCEDURE — 99284 EMERGENCY DEPT VISIT MOD MDM: CPT

## 2020-03-01 PROCEDURE — 96376 TX/PRO/DX INJ SAME DRUG ADON: CPT

## 2020-03-01 PROCEDURE — 63710000001 ONDANSETRON ODT 4 MG TABLET DISPERSIBLE: Performed by: NURSE PRACTITIONER

## 2020-03-01 PROCEDURE — 81003 URINALYSIS AUTO W/O SCOPE: CPT | Performed by: NURSE PRACTITIONER

## 2020-03-01 PROCEDURE — 25010000002 PROMETHAZINE PER 50 MG: Performed by: NURSE PRACTITIONER

## 2020-03-01 PROCEDURE — 96375 TX/PRO/DX INJ NEW DRUG ADDON: CPT

## 2020-03-01 PROCEDURE — 82272 OCCULT BLD FECES 1-3 TESTS: CPT | Performed by: NURSE PRACTITIONER

## 2020-03-01 PROCEDURE — 82150 ASSAY OF AMYLASE: CPT | Performed by: NURSE PRACTITIONER

## 2020-03-01 PROCEDURE — 25010000002 IOPAMIDOL 61 % SOLUTION: Performed by: FAMILY MEDICINE

## 2020-03-01 PROCEDURE — 80053 COMPREHEN METABOLIC PANEL: CPT | Performed by: NURSE PRACTITIONER

## 2020-03-01 PROCEDURE — 0097U HC BIOFIRE FILMARRAY GI PANEL: CPT | Performed by: NURSE PRACTITIONER

## 2020-03-01 PROCEDURE — 83690 ASSAY OF LIPASE: CPT | Performed by: NURSE PRACTITIONER

## 2020-03-01 PROCEDURE — 87493 C DIFF AMPLIFIED PROBE: CPT | Performed by: NURSE PRACTITIONER

## 2020-03-01 PROCEDURE — 74177 CT ABD & PELVIS W/CONTRAST: CPT

## 2020-03-01 PROCEDURE — 85025 COMPLETE CBC W/AUTO DIFF WBC: CPT | Performed by: NURSE PRACTITIONER

## 2020-03-01 PROCEDURE — 25010000002 HYDROMORPHONE 1 MG/ML SOLUTION: Performed by: NURSE PRACTITIONER

## 2020-03-01 RX ORDER — PROMETHAZINE HYDROCHLORIDE 25 MG/ML
12.5 INJECTION, SOLUTION INTRAMUSCULAR; INTRAVENOUS ONCE
Status: COMPLETED | OUTPATIENT
Start: 2020-03-01 | End: 2020-03-01

## 2020-03-01 RX ORDER — SODIUM CHLORIDE 0.9 % (FLUSH) 0.9 %
10 SYRINGE (ML) INJECTION AS NEEDED
Status: DISCONTINUED | OUTPATIENT
Start: 2020-03-01 | End: 2020-03-01 | Stop reason: HOSPADM

## 2020-03-01 RX ORDER — ONDANSETRON 4 MG/1
4 TABLET, FILM COATED ORAL EVERY 8 HOURS PRN
Qty: 12 TABLET | Refills: 0 | Status: SHIPPED | OUTPATIENT
Start: 2020-03-01

## 2020-03-01 RX ORDER — ONDANSETRON 4 MG/1
4 TABLET, ORALLY DISINTEGRATING ORAL ONCE
Status: COMPLETED | OUTPATIENT
Start: 2020-03-01 | End: 2020-03-01

## 2020-03-01 RX ADMIN — SODIUM CHLORIDE 50 ML: 9 INJECTION, SOLUTION INTRAVENOUS at 14:06

## 2020-03-01 RX ADMIN — IOPAMIDOL 95 ML: 612 INJECTION, SOLUTION INTRAVENOUS at 11:21

## 2020-03-01 RX ADMIN — SODIUM CHLORIDE 1000 ML: 900 INJECTION, SOLUTION INTRAVENOUS at 10:32

## 2020-03-01 RX ADMIN — HYDROMORPHONE HYDROCHLORIDE 0.5 MG: 1 INJECTION, SOLUTION INTRAMUSCULAR; INTRAVENOUS; SUBCUTANEOUS at 10:33

## 2020-03-01 RX ADMIN — PROMETHAZINE HYDROCHLORIDE 12.5 MG: 25 INJECTION INTRAMUSCULAR; INTRAVENOUS at 14:07

## 2020-03-01 RX ADMIN — HYDROMORPHONE HYDROCHLORIDE 0.5 MG: 1 INJECTION, SOLUTION INTRAMUSCULAR; INTRAVENOUS; SUBCUTANEOUS at 14:07

## 2020-03-01 RX ADMIN — ONDANSETRON 4 MG: 4 TABLET, ORALLY DISINTEGRATING ORAL at 10:33

## 2020-03-01 NOTE — ED PROVIDER NOTES
Subjective   Patient states he has been having some abdominal pain for the past 3 years. He states usually it is located in his LUQ and has has been dx with spleenomegaly from unknown cause. [facility] He was recently sent to Dr. Kim for evaluation of his spleen and his biopsy came back negative. He was referred to Dr. Hayes where he had a colonoscopy and EGD about last week. He states since then he has progressively gotten to feeling worse with generalized weakness. He states he has continued with intermittent diarrhea since but last night 0100 he started with vomiting and uncontrollable diarrhea. He states he is not able to hold his liquid diarrhea all the time and had messed on his clothes. He states today his pain is generalized and more localized to the LLQ. Medications include none at this time. He reports ~10 episodes of diarrhea in the past 9 hours.           Review of Systems   Constitutional: Positive for appetite change and fatigue. Negative for chills, diaphoresis and fever.   HENT: Negative.    Respiratory: Negative for cough, chest tightness, shortness of breath and wheezing.    Cardiovascular: Negative for chest pain and palpitations.   Gastrointestinal: Positive for abdominal pain, diarrhea, nausea and vomiting. Negative for abdominal distention, blood in stool and constipation.   Genitourinary: Negative.    Musculoskeletal: Positive for back pain (left flank/lower back). Negative for neck pain.   Skin: Negative.    Neurological: Negative for dizziness, syncope, light-headedness, numbness and headaches.   Hematological: Does not bruise/bleed easily.   Psychiatric/Behavioral: Negative.        Past Medical History:   Diagnosis Date   • Anxiety    • Arthritis    • Back pain    • Fibromyalgia    • GERD (gastroesophageal reflux disease)    • Hemorrhoids    • Hx of heartburn    • Hypertension    • Pain     hip, joint, neck, knee, back    • Problems  "with hearing    • Sleep apnea    • Sleep apnea     uses Cpap/Bipap    • Splenomegaly        Allergies   Allergen Reactions   • Penicillins Rash       Past Surgical History:   Procedure Laterality Date   • BACK SURGERY      L4 and L5 fused   • BACK SURGERY  2011   • COLONOSCOPY N/A 10/27/2017    Procedure: COLONOSCOPY;  Surgeon: Tai Hayes MD;  Location: [facility] ENDOSCOPY;  Service:    • COLONOSCOPY N/A 2/24/2020    Procedure: COLONOSCOPY;  Surgeon: Tai Hayes MD;  Location: [facility] ENDOSCOPY;  Service: Gastroenterology;  Laterality: N/A;   • ENDOSCOPY N/A 2/24/2020    Procedure: ESOPHAGOGASTRODUODENOSCOPY;  Surgeon: Tai Hayes MD;  Location: [facility] ENDOSCOPY;  Service: Gastroenterology;  Laterality: N/A;   • JOINT REPLACEMENT Left 02/2019    knee   • REPLACEMENT TOTAL KNEE Left 02/2019       Family History   Problem Relation Age of Onset   • Heart attack Father    • Heart disease Mother        Social History     Socioeconomic History   • Marital status:      Spouse name: Not on file   • Number of children: Not on file   • Years of education: Not on file   • Highest education level: Not on file   Tobacco Use   • Smoking status: Never Smoker   • Smokeless tobacco: Never Used   Substance and Sexual Activity   • Alcohol use: Yes     Alcohol/week: 1.0 standard drinks     Types: 1 Glasses of wine per week     Comment: occassionally   • Drug use: No   • Sexual activity: Defer           Objective    /64   Pulse 115   Temp 99.3 °F (37.4 °C) (Oral)   Resp 18   Ht 177.8 cm (70\")   Wt 103 kg (228 lb)   SpO2 96%   BMI 32.71 kg/m²     Physical Exam   Constitutional: He is oriented to person, place, and time. He appears well-developed and well-nourished.  Non-toxic appearance. He does not appear ill. No distress.   HENT:   Head: Normocephalic and atraumatic.   Cardiovascular: Regular rhythm, normal heart sounds and intact distal pulses.   No murmur heard.  Elevated rate   Pulmonary/Chest: Effort " normal and breath sounds normal. No respiratory distress. He has no wheezes. He has no rales.   Abdominal: Soft. Normal appearance and bowel sounds are normal. He exhibits no distension. There is generalized tenderness and tenderness in the left lower quadrant. There is guarding and CVA tenderness (left). There is no rebound.   Patient facial grimacing with light palpation throughout abd. Unable to press adequately to palpated margins of spleen secondary to pain with palpation. Tenderness to LLQ with lightly palpation    Neurological: He is alert and oriented to person, place, and time.   Skin: Skin is warm and dry. Capillary refill takes less than 2 seconds.   Psychiatric: He has a normal mood and affect. His behavior is normal.   Nursing note and vitals reviewed.      Procedures  Results for orders placed or performed during the hospital encounter of 03/01/20   Gastrointestinal Panel, PCR - Stool, Per Rectum   Result Value Ref Range    Campylobacter Not Detected Not Detected    Plesiomonas shigelloides Not Detected Not Detected    Salmonella Not Detected Not Detected    Vibrio Not Detected Not Detected    Vibrio cholerae Not Detected Not Detected    Yersinia enterocolitica Not Detected Not Detected    Enteroaggregative E. coli (EAEC) Not Detected Not Detected    Enteropathogenic E. coli (EPEC) Not Detected Not Detected    Enterotoxigenic E. coli (ETEC) lt/st Not Detected Not Detected    Shiga-like toxin-producing E. coli (STEC) stx1/stx2 Not Detected Not Detected    E. coli O157 Not Detected Not Detected    Shigella/Enteroinvasive E. coli (EIEC) Not Detected Not Detected    Cryptosporidium Not Detected Not Detected    Cyclospora cayetanensis Not Detected Not Detected    Entamoeba histolytica Not Detected Not Detected    Giardia lamblia Not Detected Not Detected    Adenovirus F40/41 Not Detected Not Detected    Astrovirus Not Detected Not Detected    Norovirus GI/GII Detected (A) Not Detected    Rotavirus A Not  Detected Not Detected    Sapovirus (I, II, IV or V) Not Detected Not Detected   Clostridium Difficile Toxin, PCR - Stool, Per Rectum   Result Value Ref Range    C. Difficile Toxins by PCR Negative Negative   Comprehensive Metabolic Panel   Result Value Ref Range    Glucose 144 (H) 65 - 99 mg/dL    BUN 14 6 - 20 mg/dL    Creatinine 1.19 0.76 - 1.27 mg/dL    Sodium 142 136 - 145 mmol/L    Potassium 4.2 3.5 - 5.2 mmol/L    Chloride 103 98 - 107 mmol/L    CO2 22.0 22.0 - 29.0 mmol/L    Calcium 9.9 8.6 - 10.5 mg/dL    Total Protein 7.9 6.0 - 8.5 g/dL    Albumin 4.80 3.50 - 5.20 g/dL    ALT (SGPT) 25 1 - 41 U/L    AST (SGOT) 20 1 - 40 U/L    Alkaline Phosphatase 86 39 - 117 U/L    Total Bilirubin 0.7 0.2 - 1.2 mg/dL    eGFR Non African Amer 64 >60 mL/min/1.73    Globulin 3.1 gm/dL    A/G Ratio 1.5 g/dL    BUN/Creatinine Ratio 11.8 7.0 - 25.0    Anion Gap 17.0 (H) 5.0 - 15.0 mmol/L   Lipase   Result Value Ref Range    Lipase 35 13 - 60 U/L   Amylase   Result Value Ref Range    Amylase 89 28 - 100 U/L   Urinalysis With Microscopic If Indicated (No Culture) - Urine, Clean Catch   Result Value Ref Range    Color, UA Yellow Yellow, Straw, Dark Yellow, Kemi    Appearance, UA Clear Clear    pH, UA <=5.0 5.0 - 9.0    Specific Gravity, UA 1.042 (H) 1.003 - 1.030    Glucose, UA Negative Negative    Ketones, UA Negative Negative    Bilirubin, UA Negative Negative    Blood, UA Negative Negative    Protein, UA Negative Negative    Leuk Esterase, UA Negative Negative    Nitrite, UA Negative Negative    Urobilinogen, UA 0.2 E.U./dL 0.2 - 1.0 E.U./dL   Occult Blood X 1, Stool - Stool, Per Rectum   Result Value Ref Range    Fecal Occult Blood Positive (A) Negative   CBC Auto Differential   Result Value Ref Range    WBC 8.34 3.40 - 10.80 10*3/mm3    RBC 5.65 4.14 - 5.80 10*6/mm3    Hemoglobin 16.3 13.0 - 17.7 g/dL    Hematocrit 46.5 37.5 - 51.0 %    MCV 82.3 79.0 - 97.0 fL    MCH 28.8 26.6 - 33.0 pg    MCHC 35.1 31.5 - 35.7 g/dL    RDW  13.9 12.3 - 15.4 %    RDW-SD 40.3 37.0 - 54.0 fl    MPV 10.6 6.0 - 12.0 fL    Platelets 152 140 - 450 10*3/mm3    Neutrophil % 89.3 (H) 42.7 - 76.0 %    Lymphocyte % 4.6 (L) 19.6 - 45.3 %    Monocyte % 4.4 (L) 5.0 - 12.0 %    Eosinophil % 0.8 0.3 - 6.2 %    Basophil % 0.4 0.0 - 1.5 %    Immature Grans % 0.5 0.0 - 0.5 %    Neutrophils, Absolute 7.45 (H) 1.70 - 7.00 10*3/mm3    Lymphocytes, Absolute 0.38 (L) 0.70 - 3.10 10*3/mm3    Monocytes, Absolute 0.37 0.10 - 0.90 10*3/mm3    Eosinophils, Absolute 0.07 0.00 - 0.40 10*3/mm3    Basophils, Absolute 0.03 0.00 - 0.20 10*3/mm3    Immature Grans, Absolute 0.04 0.00 - 0.05 10*3/mm3    nRBC 0.0 0.0 - 0.2 /100 WBC   Light Blue Top   Result Value Ref Range    Extra Tube hold for add-on    Gold Top - SST   Result Value Ref Range    Extra Tube Hold for add-ons.      Ct Abdomen Pelvis With Contrast    Result Date: 3/1/2020  Narrative: PROCEDURE: CT ABDOMEN PELVIS W CONTRAST INDICATION: HISTORY: COMPARISON: None  TECHNIQUE:  - reconstructions:  Axial, coronal, sagittal  - contrast:  oral:  no     intravenous:  95 mL of Isovue-300  This exam was performed according to our departmental dose-optimization program, which includes automated exposure control, adjustment of the mA and/or kV according to patient size and/or use of iterative reconstruction technique. DLP is 513.8 FINDINGS:   - - - CT ABDOMEN - - -   THORAX (INFERIOR):  - LUNG BASES:  Clear  - PLEURA:  No fluid or mass  - HEART: Normal size, no pericardial fluid  - MISC:  N/A   ABDOMEN:  - LIVER:  Normal size/contour, no ductal dilatation, no focal lesion  - GB:  Grossly negative  - CBD:  Grossly negative  - SPLEEN:  , Measuring 15.5 cm in greatest dimension  - PANCREAS:  Normal in size, contour, no focal mass  - VISCERA:  Normal caliber, no wall thickening. Much of the small bowel is fluid-filled.  - MESENTERY: No mesenteric mass  - CAVITY:  No free abdominal fluid, no free intraperitoneal air  - BODY WALL:  With normal  limits  - OSSEOUS:  Grossly negative for age  - MISC:  RETROPERITONEUM:  - KIDNEYS:Normal size/contour, no collecting system dilation                   No evidence of an enhancing mass  - URETERS:  Normal course, caliber  - ADRENALS:  Normal size, contour  - MISC:  A few mildly enlarged retroperitoneal lymph nodes are present, measuring up to 1.8 cm in greatest dimension  - VASCULAR:  Aorta / iliacs: wnl for age  - - - CT PELVIS - - -  - VISCERA:  Normal caliber small/large bowel no focal thickening or masses identified, but there is fluid within a portion of nondilated colon proximally   - APPENDIX: Not identified with certainty. No particular right lower quadrant inflammatory stranding is evident  - MESENTERY:  No mass  - VASCULAR:  Within normal limits for age  - CAVITY:  No free fluid / air  - BLADDER:  Urinary bladder appears mildly thick walled anteriorly, a finding of uncertain clinical significance but could represent cystitis. Neoplasia is thought less likely, but not excluded.  - OSSEOUS:  Posterior fusion of L4-L5 with interbody device  - PROSTATE:  Grossly wnl A right hydrocele is present, but incompletely included in this study      Impression: 1. Fluid within nondilated small bowel and colon, may represent enteritis/colitis. Clinical correlation needed 2. Nonspecific splenomegaly 3. Appendix not visualized, but no particular right lower quadrant inflammatory changes identified. 4. Urinary bladder appears mildly thick-walled anteriorly. This could be due to cystitis, but other etiologies including neoplasia cannot be excluded. Clinical correlation needed 5. There are a few mildly enlarged retroperitoneal lymph nodes in the periaortic and aortocaval space. These measure up to 1.8 cm in greatest dimension just inferior to the left renal vein. These are of uncertain etiology or clinical importance. They can be further investigated and followed up as deemed clinically warranted. 6. Suspected right  hydrocele, incompletely included in the study 7. Please see findings section above for further details Electronically signed by:  Zoe Loya MD  3/1/2020 12:19 PM CST Workstation: [id number]             ED Course                                           MDM    Final diagnoses:   Erika Welch, VAZQUEZ  03/01/20 1733

## 2020-03-03 ENCOUNTER — OFFICE VISIT (OUTPATIENT)
Dept: GASTROENTEROLOGY | Facility: CLINIC | Age: 55
End: 2020-03-03

## 2020-03-03 VITALS
HEART RATE: 64 BPM | WEIGHT: 231.6 LBS | BODY MASS INDEX: 33.16 KG/M2 | DIASTOLIC BLOOD PRESSURE: 80 MMHG | OXYGEN SATURATION: 98 % | HEIGHT: 70 IN | SYSTOLIC BLOOD PRESSURE: 130 MMHG

## 2020-03-03 DIAGNOSIS — A08.11 NOROVIRUS: Primary | ICD-10-CM

## 2020-03-03 DIAGNOSIS — R10.84 GENERALIZED ABDOMINAL PAIN: ICD-10-CM

## 2020-03-03 PROCEDURE — 99214 OFFICE O/P EST MOD 30 MIN: CPT | Performed by: INTERNAL MEDICINE

## 2020-03-03 RX ORDER — CHOLECALCIFEROL (VITAMIN D3) 50 MCG
TABLET ORAL
COMMUNITY
Start: 2019-12-23

## 2020-03-03 RX ORDER — PREGABALIN 150 MG/1
CAPSULE ORAL
COMMUNITY
Start: 2020-02-20

## 2020-03-03 RX ORDER — OMEPRAZOLE 20 MG/1
20 CAPSULE, DELAYED RELEASE ORAL DAILY
Qty: 30 CAPSULE | Refills: 5 | Status: SHIPPED | OUTPATIENT
Start: 2020-03-03

## 2020-03-03 NOTE — PROGRESS NOTES
[facility], [place], operated by [facility]      Chief Complaint:   Chief Complaint   Patient presents with   • Abdominal Pain       Subjective     HPI:   Patient with abdominal pain.  Patient had EGD and colonoscopy which showed esophagitis and gastritis colonoscopy showed a tubular adenoma patient will need repeat colonoscopy in 3 years.  Patient continues to have abdominal pain patient recently went to the emergency room with severe diarrhea stool studies show patient to have norovirus.  Discussed with patient that this will take up to 6 weeks to resolve.  Because of patient's esophagitis gastritis will have patient start Prilosec daily.  Will have patient do this for the next 4 weeks at that time we will determine if patient still has abdominal pain or not.    Plan; follow-up in 4 weeks.    Past Medical History:   Past Medical History:   Diagnosis Date   • Anxiety    • Arthritis    • Back pain    • Fibromyalgia    • GERD (gastroesophageal reflux disease)    • Hemorrhoids    • Hx of heartburn    • Hypertension    • Pain     hip, joint, neck, knee, back    • Problems with hearing    • Sleep apnea    • Sleep apnea     uses Cpap/Bipap    • Splenomegaly        Past Surgical History:    Past Surgical History:   Procedure Laterality Date   • BACK SURGERY      L4 and L5 fused   • BACK SURGERY  2011   • COLONOSCOPY N/A 10/27/2017    Procedure: COLONOSCOPY;  Surgeon: Tai Hayes MD;  Location: [facility] ENDOSCOPY;  Service:    • COLONOSCOPY N/A 2/24/2020    Procedure: COLONOSCOPY;  Surgeon: Tai Hayes MD;  Location: [facility] ENDOSCOPY;  Service: Gastroenterology;  Laterality: N/A;   • ENDOSCOPY N/A 2/24/2020    Procedure: ESOPHAGOGASTRODUODENOSCOPY;  Surgeon: Tai Hayes MD;  Location: [facility] ENDOSCOPY;  Service: Gastroenterology;  Laterality: N/A;   • JOINT REPLACEMENT Left 02/2019    knee   • REPLACEMENT TOTAL KNEE Left 02/2019       Family History:  Family History   Problem Relation Age of Onset   • Heart attack  Father    • Heart disease Mother        Social History:   reports that he has never smoked. He has never used smokeless tobacco. He reports that he drinks about 1.0 standard drinks of alcohol per week. He reports that he does not use drugs.    Medications:   Prior to Admission medications    Medication Sig Start Date End Date Taking? Authorizing Provider   aspirin 81 MG chewable tablet Chew 81 mg Daily.   Yes Desirae Maier MD   ondansetron (ZOFRAN) 4 MG tablet Take 1 tablet by mouth Every 8 (Eight) Hours As Needed for Nausea or Vomiting. 3/1/20  Yes Erika Randolph APRN   Pregabalin (LYRICA PO) Take 150 mg by mouth Daily.   Yes Desirae Maier MD   zolpidem (AMBIEN) 10 MG tablet Take 5 mg by mouth Daily.   Yes Desirae Maier MD   OMEPRAZOLE PO Take 20 mg by mouth As Needed.  3/3/20 Yes Desirae Maier MD   omeprazole (priLOSEC) 20 MG capsule Take 1 capsule by mouth Daily. 3/3/20   Tai Hayes MD   pregabalin (LYRICA) 150 MG capsule  2/20/20   Desirae Maier MD   VITAMIN D3 SUPER STRENGTH 50 MCG (2000 UT) tablet  12/23/19   Desirae Maier MD       Allergies:  Penicillins    ROS:    Review of Systems   Constitutional: Negative for activity change, appetite change and unexpected weight change.   HENT: Negative for congestion, sore throat and trouble swallowing.    Respiratory: Negative for cough, choking and shortness of breath.    Cardiovascular: Negative for chest pain.   Gastrointestinal: Negative for abdominal distention, abdominal pain, anal bleeding, blood in stool, constipation, diarrhea, nausea, rectal pain and vomiting.   Endocrine: Negative for heat intolerance, polydipsia and polyphagia.   Genitourinary: Negative for difficulty urinating.   Musculoskeletal: Negative for arthralgias.   Skin: Negative for color change, pallor, rash and wound.   Allergic/Immunologic: Negative for food allergies.   Neurological: Negative for dizziness, syncope, weakness and  "headaches.   Psychiatric/Behavioral: Negative for agitation, behavioral problems, confusion and decreased concentration.     Objective     Blood pressure 130/80, pulse 64, height 177.8 cm (70\"), weight 105 kg (231 lb 9.6 oz), SpO2 98 %.    Physical Exam   Constitutional: He is oriented to person, place, and time. He appears well-developed and well-nourished. No distress.   HENT:   Head: Normocephalic and atraumatic.   Cardiovascular: Normal rate, regular rhythm, normal heart sounds and intact distal pulses. Exam reveals no gallop and no friction rub.   No murmur heard.  Pulmonary/Chest: Breath sounds normal. No respiratory distress. He has no wheezes. He has no rales. He exhibits no tenderness.   Abdominal: Soft. Bowel sounds are normal. He exhibits no distension and no mass. There is no tenderness. There is no rebound and no guarding. No hernia.   Musculoskeletal: Normal range of motion. He exhibits no edema.   Neurological: He is alert and oriented to person, place, and time.   Skin: Skin is warm and dry. No rash noted. He is not diaphoretic. No erythema. No pallor.   Psychiatric: He has a normal mood and affect. His behavior is normal. Judgment and thought content normal.        Assessment/Plan   Nicholas was seen today for abdominal pain.    Diagnoses and all orders for this visit:    Norovirus    Generalized abdominal pain    Other orders  -     omeprazole (priLOSEC) 20 MG capsule; Take 1 capsule by mouth Daily.        * Surgery not found *     Diagnosis Plan   1. Norovirus     2. Generalized abdominal pain         Anticipated Surgical Procedure:  No orders of the defined types were placed in this encounter.      The risks, benefits, and alternatives of this procedure have been discussed with the patient or the responsible party- the patient understands and agrees to proceed.                                                                "

## 2020-03-03 NOTE — PATIENT INSTRUCTIONS

## 2020-06-05 ENCOUNTER — HOSPITAL ENCOUNTER (OUTPATIENT)
Dept: ULTRASOUND IMAGING | Facility: HOSPITAL | Age: 55
Discharge: HOME | End: 2020-06-05
Admitting: INTERNAL MEDICINE

## 2020-06-05 DIAGNOSIS — R16.1 SPLENOMEGALY: ICD-10-CM

## 2020-06-05 PROCEDURE — 76705 ECHO EXAM OF ABDOMEN: CPT

## 2020-06-10 ENCOUNTER — LAB (OUTPATIENT)
Dept: ONCOLOGY | Facility: HOSPITAL | Age: 55
End: 2020-06-10

## 2020-06-10 ENCOUNTER — OFFICE VISIT (OUTPATIENT)
Dept: ONCOLOGY | Facility: CLINIC | Age: 55
End: 2020-06-10

## 2020-06-10 VITALS
SYSTOLIC BLOOD PRESSURE: 135 MMHG | BODY MASS INDEX: 32.37 KG/M2 | TEMPERATURE: 98.4 F | DIASTOLIC BLOOD PRESSURE: 77 MMHG | HEIGHT: 70 IN | WEIGHT: 226.1 LBS | HEART RATE: 75 BPM | RESPIRATION RATE: 18 BRPM

## 2020-06-10 DIAGNOSIS — R59.1 LYMPHADENOPATHY: ICD-10-CM

## 2020-06-10 DIAGNOSIS — R10.32 LEFT LOWER QUADRANT ABDOMINAL PAIN: ICD-10-CM

## 2020-06-10 DIAGNOSIS — R16.1 SPLENOMEGALY: ICD-10-CM

## 2020-06-10 DIAGNOSIS — R16.1 SPLENOMEGALY: Primary | ICD-10-CM

## 2020-06-10 LAB
ALBUMIN SERPL-MCNC: 4.4 G/DL (ref 3.5–5.2)
ALBUMIN/GLOB SERPL: 1.7 G/DL
ALP SERPL-CCNC: 101 U/L (ref 39–117)
ALT SERPL W P-5'-P-CCNC: 27 U/L (ref 1–41)
ANION GAP SERPL CALCULATED.3IONS-SCNC: 10 MMOL/L (ref 5–15)
AST SERPL-CCNC: 22 U/L (ref 1–40)
BASOPHILS # BLD AUTO: 0.06 10*3/MM3 (ref 0–0.2)
BASOPHILS NFR BLD AUTO: 1.2 % (ref 0–1.5)
BILIRUB SERPL-MCNC: 0.4 MG/DL (ref 0.2–1.2)
BUN BLD-MCNC: 17 MG/DL (ref 6–20)
BUN/CREAT SERPL: 20.7 (ref 7–25)
CALCIUM SPEC-SCNC: 8.8 MG/DL (ref 8.6–10.5)
CHLORIDE SERPL-SCNC: 106 MMOL/L (ref 98–107)
CO2 SERPL-SCNC: 24 MMOL/L (ref 22–29)
CREAT BLD-MCNC: 0.82 MG/DL (ref 0.76–1.27)
DEPRECATED RDW RBC AUTO: 39.7 FL (ref 37–54)
EOSINOPHIL # BLD AUTO: 0.12 10*3/MM3 (ref 0–0.4)
EOSINOPHIL NFR BLD AUTO: 2.4 % (ref 0.3–6.2)
ERYTHROCYTE [DISTWIDTH] IN BLOOD BY AUTOMATED COUNT: 13.4 % (ref 12.3–15.4)
GFR SERPL CREATININE-BSD FRML MDRD: 99 ML/MIN/1.73
GLOBULIN UR ELPH-MCNC: 2.6 GM/DL
GLUCOSE BLD-MCNC: 111 MG/DL (ref 65–99)
HCT VFR BLD AUTO: 43.2 % (ref 37.5–51)
HGB BLD-MCNC: 15.1 G/DL (ref 13–17.7)
IMM GRANULOCYTES # BLD AUTO: 0.05 10*3/MM3 (ref 0–0.05)
IMM GRANULOCYTES NFR BLD AUTO: 1 % (ref 0–0.5)
LYMPHOCYTES # BLD AUTO: 1.62 10*3/MM3 (ref 0.7–3.1)
LYMPHOCYTES NFR BLD AUTO: 32.1 % (ref 19.6–45.3)
MCH RBC QN AUTO: 28.6 PG (ref 26.6–33)
MCHC RBC AUTO-ENTMCNC: 35 G/DL (ref 31.5–35.7)
MCV RBC AUTO: 81.8 FL (ref 79–97)
MONOCYTES # BLD AUTO: 0.39 10*3/MM3 (ref 0.1–0.9)
MONOCYTES NFR BLD AUTO: 7.7 % (ref 5–12)
NEUTROPHILS # BLD AUTO: 2.81 10*3/MM3 (ref 1.7–7)
NEUTROPHILS NFR BLD AUTO: 55.6 % (ref 42.7–76)
NRBC BLD AUTO-RTO: 0 /100 WBC (ref 0–0.2)
PLATELET # BLD AUTO: 172 10*3/MM3 (ref 140–450)
PMV BLD AUTO: 10.7 FL (ref 6–12)
POTASSIUM BLD-SCNC: 3.9 MMOL/L (ref 3.5–5.2)
PROT SERPL-MCNC: 7 G/DL (ref 6–8.5)
RBC # BLD AUTO: 5.28 10*6/MM3 (ref 4.14–5.8)
SODIUM BLD-SCNC: 140 MMOL/L (ref 136–145)
WBC NRBC COR # BLD: 5.05 10*3/MM3 (ref 3.4–10.8)

## 2020-06-10 PROCEDURE — 85025 COMPLETE CBC W/AUTO DIFF WBC: CPT | Performed by: INTERNAL MEDICINE

## 2020-06-10 PROCEDURE — G0463 HOSPITAL OUTPT CLINIC VISIT: HCPCS | Performed by: INTERNAL MEDICINE

## 2020-06-10 PROCEDURE — 80053 COMPREHEN METABOLIC PANEL: CPT | Performed by: INTERNAL MEDICINE

## 2020-06-10 PROCEDURE — 99213 OFFICE O/P EST LOW 20 MIN: CPT | Performed by: INTERNAL MEDICINE

## 2020-06-10 NOTE — PROGRESS NOTES
"Nicholas Negrete  [id number]  1965    Subjective     Ms Negrete was seen in follow up for abdominal pain, splenomegaly and lymphadenopathy.     Persistent abdominal pain.   Gi work up negative.   Result of ultrasound reviewed, showed no change in spleen size.   Blood counts stable.   Etiology of abdominal pain unclear, but doubt related to splenomegaly.     ROS as below.     Past Medical History, Past Surgical History, Social History, Family History have been reviewed and are without significant changes except as mentioned.    Review of Systems   CONSTITUTIONAL: fatigue +  No weight loss, fever, chills, weakness.  HEENT: Eyes: No visual loss, blurred vision, double vision or yellow sclerae. Ears, Nose, Throat: No hearing loss, sneezing, congestion, runny nose or sore throat.  SKIN: No rash or itching.  CARDIOVASCULAR: No chest pain, chest pressure or chest discomfort. No palpitations or edema.  RESPIRATORY: No shortness of breath, cough or sputum.  GASTROINTESTINAL:abdominal pain +  No anorexia, nausea, vomiting or diarrhea. No  blood.  GENITOURINARY: Negative for urgency, frequency or dysuria.   NEUROLOGICAL: No headache, dizziness, syncope, paralysis, ataxia, numbness or tingling in the extremities. No change in bowel or bladder control.  MUSCULOSKELETAL: chronic pain + .  HEMATOLOGIC: No anemia, bleeding or bruising.  LYMPHATICS: No enlarged nodes. No history of splenectomy.  PSYCHIATRIC: No history of depression or anxiety.  ENDOCRINOLOGIC: No reports of sweating, cold or heat intolerance. No polyuria or polydipsia.  ALLERGIES: No history of asthma, hives, eczema or rhinitis.    Medications:  The current medication list was reviewed in the EMR    ALLERGIES:    Allergies   Allergen Reactions   • Penicillins Rash       Objective      Vitals:    06/10/20 1043   BP: 135/77   Pulse: 75   Resp: 18   Temp: 98.4 °F (36.9 °C)   TempSrc: Temporal   Weight: 103 kg (226 lb 1.6 oz)   Height: 177.8 cm (70\") "   PainSc: 0-No pain     Current Status 6/10/2020   ECOG score 0       Physical Exam      GENERAL: Alert, awake, oriented.  Well dressed.  Not in apparent distress. Vitals as above.   HEAD: Normocephalic, atraumatic.   EYES: PERRL, EOMI.  vision is grossly intact.  NECK: Supple, no adenopathy or thyromegaly.   THROAT: Normal oral cavity and pharynx. No inflammation, swelling, exudate, or lesions.  CARDIAC: Normal S1 and S2. No S3, S4 or murmurs. Rhythm is regular.  Extremities are warm and well perfused.   LUNGS: Clear to auscultation and percussion without rales, rhonchi, wheezing or diminished breath sounds.  ABDOMEN: Positive bowel sounds. Soft, nondistended, nontender. No guarding or rebound. No masses.  BACK:  No bony tenderness.   EXTREMITIES: No significant deformity or joint abnormality.  Peripheral pulses intact. No varicosities.  SKIN: No rash or bruising.  NEUROLOGICAL: Grossly non-focal exam. No focal weakness. Gait: Normal.   PSYCH: Mood and affect normal. No hallucination or suicidal thoughts.   LYMPHATIC: No cervical, supraclavicular or axillary adenopathy.     RECENT LABS: Independently reviewed and summarized  Hematology WBC   Date Value Ref Range Status   06/10/2020 5.05 3.40 - 10.80 10*3/mm3 Final     RBC   Date Value Ref Range Status   06/10/2020 5.28 4.14 - 5.80 10*6/mm3 Final     Hemoglobin   Date Value Ref Range Status   06/10/2020 15.1 13.0 - 17.7 g/dL Final     Hematocrit   Date Value Ref Range Status   06/10/2020 43.2 37.5 - 51.0 % Final     Platelets   Date Value Ref Range Status   06/10/2020 172 140 - 450 10*3/mm3 Final          Imaging (independently reviewed and summarized):   Patient had CT scan of abdomen pelvis with and without contrast performed on December 9, 2019.  This showed hepatic steatosis.  Showed mildly enlarged spleen measuring 15.3 cm.  Compared to prior CT scan from August 2017 spleen was slightly enlarged.  At that time his spleen was 14.7 cm.  He also had mildly  enlarged para-aortic lymph node measuring 15 x 6 mm.  None of these lymph nodes felt to be enlarged pathologically as per CT criteria.    Bone marrow aspiration/biopsy (1/31/20)      Bone Marrow Exam  Final result 1/31/2020   Final Diagnosis   PERIPHERAL SMEAR, REVIEW:  NO SIGNIFICANT MORPHOLOGIC ABNORMALITY.     BONE MARROW ASPIRATE, CLOT, AND BIOPSY, LEFT POSTERIOR ILIAC CREST:  ERYTHROID HYPERPLASIA.  NEGATIVE FOR MORPHOLOGIC EVIDENCE OF LYMPHOMA.                [facility]     Nicholas Hernandez Caden reports a pain score of 0.  Given his pain assessment as noted, treatment options were discussed and the following options were decided upon as a follow-up plan to address the patient's pain: continuation of current treatment plan for pain.     PHQ-9 Total Score: 0  Depression screen negative.      Diagnosis:   (1) Splenomegaly   (2) Lymphadenopathy   (3) Fatty liver disease  (4) Abdominal pain         Assessment/Plan          (1) Splenomegaly (2) Lymphadenopathy (3) Fatty liver disease(4) Abdominal pain      Result of bone marrow aspiration biopsy discussed with the patient.  Showed no evidence of hematological disorder.  Ultrasound showed spleen size unchanged. This might be normal for him.   Discussed with patient again that I do not believe that his splenomegaly is causing his pain.    No definite evidence of any hematological disorder at this point.  Etiology of his abdominal pain is unclear.       6/10/2020      CC:

## 2020-11-30 ENCOUNTER — HOSPITAL ENCOUNTER (OUTPATIENT)
Dept: MRI IMAGING | Facility: HOSPITAL | Age: 55
Discharge: HOME | End: 2020-11-30
Admitting: FAMILY MEDICINE

## 2020-11-30 DIAGNOSIS — M47.22 OTHER SPONDYLOSIS WITH RADICULOPATHY, CERVICAL REGION: ICD-10-CM

## 2020-11-30 PROCEDURE — 72141 MRI NECK SPINE W/O DYE: CPT

## 2020-12-10 ENCOUNTER — LAB (OUTPATIENT)
Dept: ONCOLOGY | Facility: HOSPITAL | Age: 55
End: 2020-12-10

## 2020-12-10 ENCOUNTER — OFFICE VISIT (OUTPATIENT)
Dept: ONCOLOGY | Facility: CLINIC | Age: 55
End: 2020-12-10

## 2020-12-10 VITALS
WEIGHT: 239.5 LBS | HEIGHT: 70 IN | DIASTOLIC BLOOD PRESSURE: 85 MMHG | HEART RATE: 61 BPM | TEMPERATURE: 98 F | BODY MASS INDEX: 34.29 KG/M2 | RESPIRATION RATE: 18 BRPM | SYSTOLIC BLOOD PRESSURE: 143 MMHG

## 2020-12-10 DIAGNOSIS — R16.1 SPLENOMEGALY: ICD-10-CM

## 2020-12-10 LAB
ALBUMIN SERPL-MCNC: 4.5 G/DL (ref 3.5–5.2)
ALBUMIN/GLOB SERPL: 1.4 G/DL
ALP SERPL-CCNC: 73 U/L (ref 39–117)
ALT SERPL W P-5'-P-CCNC: 29 U/L (ref 1–41)
ANION GAP SERPL CALCULATED.3IONS-SCNC: 9 MMOL/L (ref 5–15)
AST SERPL-CCNC: 22 U/L (ref 1–40)
BASOPHILS # BLD AUTO: 0.05 10*3/MM3 (ref 0–0.2)
BASOPHILS NFR BLD AUTO: 0.9 % (ref 0–1.5)
BILIRUB SERPL-MCNC: 0.6 MG/DL (ref 0–1.2)
BUN SERPL-MCNC: 18 MG/DL (ref 6–20)
BUN/CREAT SERPL: 19.6 (ref 7–25)
CALCIUM SPEC-SCNC: 9.6 MG/DL (ref 8.6–10.5)
CHLORIDE SERPL-SCNC: 102 MMOL/L (ref 98–107)
CO2 SERPL-SCNC: 28 MMOL/L (ref 22–29)
CREAT SERPL-MCNC: 0.92 MG/DL (ref 0.76–1.27)
DEPRECATED RDW RBC AUTO: 38.5 FL (ref 37–54)
EOSINOPHIL # BLD AUTO: 0.1 10*3/MM3 (ref 0–0.4)
EOSINOPHIL NFR BLD AUTO: 1.8 % (ref 0.3–6.2)
ERYTHROCYTE [DISTWIDTH] IN BLOOD BY AUTOMATED COUNT: 13.5 % (ref 12.3–15.4)
GFR SERPL CREATININE-BSD FRML MDRD: 86 ML/MIN/1.73
GLOBULIN UR ELPH-MCNC: 3.2 GM/DL
GLUCOSE SERPL-MCNC: 99 MG/DL (ref 65–99)
HCT VFR BLD AUTO: 43.7 % (ref 37.5–51)
HGB BLD-MCNC: 15.9 G/DL (ref 13–17.7)
IMM GRANULOCYTES # BLD AUTO: 0.04 10*3/MM3 (ref 0–0.05)
IMM GRANULOCYTES NFR BLD AUTO: 0.7 % (ref 0–0.5)
LYMPHOCYTES # BLD AUTO: 2.1 10*3/MM3 (ref 0.7–3.1)
LYMPHOCYTES NFR BLD AUTO: 37.4 % (ref 19.6–45.3)
MCH RBC QN AUTO: 29.4 PG (ref 26.6–33)
MCHC RBC AUTO-ENTMCNC: 36.4 G/DL (ref 31.5–35.7)
MCV RBC AUTO: 80.8 FL (ref 79–97)
MONOCYTES # BLD AUTO: 0.49 10*3/MM3 (ref 0.1–0.9)
MONOCYTES NFR BLD AUTO: 8.7 % (ref 5–12)
NEUTROPHILS NFR BLD AUTO: 2.83 10*3/MM3 (ref 1.7–7)
NEUTROPHILS NFR BLD AUTO: 50.5 % (ref 42.7–76)
NRBC BLD AUTO-RTO: 0 /100 WBC (ref 0–0.2)
PLATELET # BLD AUTO: 169 10*3/MM3 (ref 140–450)
PMV BLD AUTO: 10.9 FL (ref 6–12)
POTASSIUM SERPL-SCNC: 4.6 MMOL/L (ref 3.5–5.2)
PROT SERPL-MCNC: 7.7 G/DL (ref 6–8.5)
RBC # BLD AUTO: 5.41 10*6/MM3 (ref 4.14–5.8)
SODIUM SERPL-SCNC: 139 MMOL/L (ref 136–145)
WBC # BLD AUTO: 5.61 10*3/MM3 (ref 3.4–10.8)

## 2020-12-10 PROCEDURE — 85025 COMPLETE CBC W/AUTO DIFF WBC: CPT

## 2020-12-10 PROCEDURE — 80053 COMPREHEN METABOLIC PANEL: CPT

## 2020-12-10 PROCEDURE — 99213 OFFICE O/P EST LOW 20 MIN: CPT | Performed by: INTERNAL MEDICINE

## 2020-12-10 PROCEDURE — G0463 HOSPITAL OUTPT CLINIC VISIT: HCPCS | Performed by: INTERNAL MEDICINE

## 2020-12-10 NOTE — PROGRESS NOTES
"  Subjective     Nicholas Negrete was seen in follow-up for splenomegaly.  He is undergoing evaluation for spinal stenosis which was felt to be etiology behind his radicular abdominal pain.  He denies any B symptoms.  No new infection.  No new hospitalizations.  ROS as below.     Past Medical History, Past Surgical History, Social History, Family History have been reviewed and are without significant changes except as mentioned.    Review of Systems       CONSTITUTIONAL: fatigue + No weight loss, fever, chills, weakness.  HEENT: Eyes: No visual loss, blurred vision, double vision or yellow sclerae. Ears, Nose, Throat: No hearing loss, sneezing, congestion, runny nose or sore throat.  SKIN: No rash or itching.  CARDIOVASCULAR: No chest pain, chest pressure or chest discomfort. No palpitations or edema.  RESPIRATORY: No shortness of breath, cough or sputum.  GASTROINTESTINAL: abdominal pain + No anorexia, nausea, vomiting or diarrhea. No blood.  GENITOURINARY: Negative for urgency, frequency or dysuria.   NEUROLOGICAL: numbness or tingling in the extremities + No headache, dizziness, syncope, paralysis, ataxia. No change in bowel or bladder control.  MUSCULOSKELETAL: No muscle, back pain, joint pain or stiffness.  HEMATOLOGIC: No anemia, bleeding or bruising.  LYMPHATICS: No enlarged nodes. No history of splenectomy.  PSYCHIATRIC: No history of depression or anxiety.  ENDOCRINOLOGIC: No reports of sweating, cold or heat intolerance. No polyuria or polydipsia.  ALLERGIES: No history of asthma, hives, eczema or rhinitis.      Medications:  The current medication list was reviewed in the EMR    ALLERGIES:    Allergies   Allergen Reactions   • Penicillins Rash       Objective      Vitals:    12/10/20 1040   BP: 143/85   Pulse: 61   Resp: 18   Temp: 98 °F (36.7 °C)   Weight: 109 kg (239 lb 8 oz)   Height: 177.8 cm (70\")   PainSc:   5   PainLoc: Generalized     Current Status 12/10/2020   ECOG score 0       Physical " Exam      GENERAL: Alert, awake, oriented.  Well dressed.  Not in apparent distress. Vitals as above.   HEAD: Normocephalic, atraumatic.   EYES: PERRL, EOMI. vision is grossly intact.  NECK: Supple, no adenopathy or thyromegaly.   THROAT: Normal oral cavity and pharynx. No inflammation, swelling, exudate, or lesions.  CARDIAC: Normal S1 and S2. No S3, S4 or murmurs. Rhythm is regular.  Extremities are warm and well perfused.   LUNGS: Clear to auscultation and percussion without rales, rhonchi, wheezing or diminished breath sounds.  ABDOMEN: Positive bowel sounds. Soft, nondistended, nontender. No guarding or rebound. No masses.  BACK:  No bony tenderness.   EXTREMITIES: No significant deformity or joint abnormality.  Peripheral pulses intact. No varicosities.  SKIN: No rash or bruising.  NEUROLOGICAL: Grossly non-focal exam. No focal weakness. Gait: Normal.   PSYCH: Mood and affect normal. No hallucination or suicidal thoughts.   LYMPHATIC: No cervical, supraclavicular or axillary adenopathy.       RECENT LABS:Independently reviewed and summarized  Hematology WBC   Date Value Ref Range Status   12/10/2020 5.61 3.40 - 10.80 10*3/mm3 Final     RBC   Date Value Ref Range Status   12/10/2020 5.41 4.14 - 5.80 10*6/mm3 Final     Hemoglobin   Date Value Ref Range Status   12/10/2020 15.9 13.0 - 17.7 g/dL Final     Hematocrit   Date Value Ref Range Status   12/10/2020 43.7 37.5 - 51.0 % Final     Platelets   Date Value Ref Range Status   12/10/2020 169 140 - 450 10*3/mm3 Final              Nicholas Negrete reports a pain score of 5.  Given his pain assessment as noted, treatment options were discussed and the following options were decided upon as a follow-up plan to address the patient's pain: referral to Primary Care for assistance in pain treatment guidance.      Patient screened positive for depression based on a PHQ-9 score of 0 on 12/10/2020. Follow-up recommendations include: Suicide Risk Assessment  performed.      Diagnosis:   (1) Splenomegaly   (2) Lymphadenopathy   (3) Fatty liver disease  (4) Abdominal pain     Assessment/Plan     Patient with mild splenomegaly likely unrelated to hematological disorder.  Likely this is fatty liver disease.  He is undergoing evaluation by neurosurgery to see if his abdominal pain is radicular pain from spinal stenosis.  I think that is more reasonable as I doubt his splenomegaly is large enough to cause him any abdominal pain.  We will recheck ultrasound spleen to measure the spleen size.      Recommendations:   - Ultrasound spleen to assess spleen size.   - Continue to monitor.       12/10/2020      CC:

## 2020-12-10 NOTE — PATIENT INSTRUCTIONS
Abdominal or Pelvic Ultrasound  An ultrasound is a test that uses sound waves to take pictures of the inside of the body. An abdominal ultrasound takes pictures of the inside of your belly (abdomen). A pelvic ultrasound takes pictures of the inside of the area between your hip bones (pelvis).  An ultrasound may be done to check an organ or look for problems. This is a safe test that does not hurt. It is done by placing a handheld device called a transducer on the outside of your belly or pelvis and moving it around.  Tell your doctor about:  · Any allergies you have.  · All medicines you are taking.  · Any surgeries you have had.  · Any medical conditions you have.  · Whether you are pregnant or may be pregnant.  What are the risks?  There are no known risks from having this test.  What happens before the procedure?  · Follow instructions from your doctor about eating or drinking before the test.  · Wear clothing that is easy to wash. Gel from the test might get on your clothes.  What happens during the procedure?    · You will lie on an exam table.  · Your clothes will be moved so your belly and pelvis are showing.  · A gel will be put on your skin. It may feel cool.  · The transducer device will be put on your skin. It will be moved back and forth over the area being looked at.  · The device will take pictures. They will show on small TV screens.  · You may be asked to change your position.  · After the exam, the gel will be cleaned off.  What happens after the procedure?  · It is up to you to get the results of your test. Ask your doctor, or the department that is doing the test, when your results will be ready.  · Keep all follow-up visits as told by your doctor. This is important.  Summary  · An ultrasound is a test that uses sound waves to take pictures of the inside of the body.  · An ultrasound may be done to check an organ or look for problems.  · The test is done by moving a handheld device around on the  outside of your belly or pelvis.  · It is up to you to get the results of your test. Be sure to ask when your results will be ready.  This information is not intended to replace advice given to you by your health care provider. Make sure you discuss any questions you have with your health care provider.  Document Revised: 07/15/2019 Document Reviewed: 07/15/2019  Elsevier Patient Education © 2020 [employer]

## 2020-12-22 ENCOUNTER — HOSPITAL ENCOUNTER (OUTPATIENT)
Dept: ULTRASOUND IMAGING | Facility: HOSPITAL | Age: 55
Discharge: HOME | End: 2020-12-22
Admitting: INTERNAL MEDICINE

## 2020-12-22 DIAGNOSIS — R16.1 SPLENOMEGALY: ICD-10-CM

## 2020-12-22 PROCEDURE — 76705 ECHO EXAM OF ABDOMEN: CPT

## 2020-12-23 ENCOUNTER — TELEPHONE (OUTPATIENT)
Dept: ONCOLOGY | Facility: HOSPITAL | Age: 55
End: 2020-12-23

## 2020-12-23 NOTE — TELEPHONE ENCOUNTER
----- Message from Khadijah Kim MD sent at 12/22/2020  6:07 PM CST -----  Let patient know his ultrasound showed enlarged spleen - mild. Unchanged compared to prior ultrasound. Continue monitoring.

## 2021-06-10 ENCOUNTER — APPOINTMENT (OUTPATIENT)
Dept: ONCOLOGY | Facility: HOSPITAL | Age: 56
End: 2021-06-10

## 2021-06-10 ENCOUNTER — APPOINTMENT (OUTPATIENT)
Dept: ONCOLOGY | Facility: CLINIC | Age: 56
End: 2021-06-10

## 2021-06-10 DIAGNOSIS — R16.1 SPLENOMEGALY: ICD-10-CM

## 2021-06-16 ENCOUNTER — APPOINTMENT (OUTPATIENT)
Dept: ONCOLOGY | Facility: CLINIC | Age: 56
End: 2021-06-16

## 2021-06-16 ENCOUNTER — APPOINTMENT (OUTPATIENT)
Dept: ONCOLOGY | Facility: HOSPITAL | Age: 56
End: 2021-06-16

## 2022-12-28 ENCOUNTER — TRANSCRIBE ORDERS (OUTPATIENT)
Dept: PODIATRY | Facility: CLINIC | Age: 57
End: 2022-12-28

## 2022-12-28 DIAGNOSIS — L60.0 INGROWING NAIL: Primary | ICD-10-CM

## 2023-01-04 ENCOUNTER — OFFICE VISIT (OUTPATIENT)
Dept: PODIATRY | Facility: CLINIC | Age: 58
End: 2023-01-04
Payer: OTHER GOVERNMENT

## 2023-01-04 VITALS — HEIGHT: 70 IN | HEART RATE: 74 BPM | WEIGHT: 239 LBS | BODY MASS INDEX: 34.22 KG/M2 | OXYGEN SATURATION: 96 %

## 2023-01-04 DIAGNOSIS — M79.674 CHRONIC PAIN OF TOE OF RIGHT FOOT: ICD-10-CM

## 2023-01-04 DIAGNOSIS — L60.0 INGROWN TOENAIL: ICD-10-CM

## 2023-01-04 DIAGNOSIS — M79.675 CHRONIC PAIN OF TOE OF LEFT FOOT: Primary | ICD-10-CM

## 2023-01-04 DIAGNOSIS — G89.29 CHRONIC PAIN OF TOE OF RIGHT FOOT: ICD-10-CM

## 2023-01-04 DIAGNOSIS — G89.29 CHRONIC PAIN OF TOE OF LEFT FOOT: Primary | ICD-10-CM

## 2023-01-04 PROCEDURE — 99203 OFFICE O/P NEW LOW 30 MIN: CPT | Performed by: PODIATRIST

## 2023-01-04 PROCEDURE — 11750 EXCISION NAIL&NAIL MATRIX: CPT | Performed by: PODIATRIST

## 2023-01-04 NOTE — PROGRESS NOTES
Nicholas Negrete  1965  57 y.o. male      01/04/2023    Chief Complaint   Patient presents with   • Left Foot - Follow-up   • Right Foot - Follow-up       History of Present Illness    Nicholas Negrete is a 57 y.o.male presents to clinic to with bilateral hallux ingrown.       Past Medical History:   Diagnosis Date   • Anxiety    • Arthritis    • Back pain    • Fibromyalgia    • GERD (gastroesophageal reflux disease)    • Hemorrhoids    • Hx of heartburn    • Hypertension    • Pain     hip, joint, neck, knee, back    • Problems with hearing    • Sleep apnea    • Sleep apnea     uses Cpap/Bipap    • Splenomegaly          Past Surgical History:   Procedure Laterality Date   • BACK SURGERY      L4 and L5 fused   • BACK SURGERY  2011   • COLONOSCOPY N/A 10/27/2017    Procedure: COLONOSCOPY;  Surgeon: Tai Hayes MD;  Location: [facility] ENDOSCOPY;  Service:    • COLONOSCOPY N/A 2/24/2020    Procedure: COLONOSCOPY;  Surgeon: Tai Hayes MD;  Location: [facility] ENDOSCOPY;  Service: Gastroenterology;  Laterality: N/A;   • ENDOSCOPY N/A 2/24/2020    Procedure: ESOPHAGOGASTRODUODENOSCOPY;  Surgeon: Tai Hayes MD;  Location: [facility] ENDOSCOPY;  Service: Gastroenterology;  Laterality: N/A;   • JOINT REPLACEMENT Left 02/2019    knee   • REPLACEMENT TOTAL KNEE Left 02/2019         Family History   Problem Relation Age of Onset   • Heart attack Father    • Heart disease Mother        Allergies   Allergen Reactions   • Penicillins Rash       Social History     Socioeconomic History   • Marital status:    Tobacco Use   • Smoking status: Never   • Smokeless tobacco: Never   Substance and Sexual Activity   • Alcohol use: Yes     Alcohol/week: 1.0 standard drink     Types: 1 Glasses of wine per week     Comment: occassionally   • Drug use: No   • Sexual activity: Defer         Current Outpatient Medications   Medication Sig Dispense Refill   • aspirin 81 MG chewable tablet Chew 81 mg Daily.     •  "omeprazole (priLOSEC) 20 MG capsule Take 1 capsule by mouth Daily. 30 capsule 5   • ondansetron (ZOFRAN) 4 MG tablet Take 1 tablet by mouth Every 8 (Eight) Hours As Needed for Nausea or Vomiting. 12 tablet 0   • Pregabalin (LYRICA PO) Take 150 mg by mouth Daily.     • pregabalin (LYRICA) 150 MG capsule      • VITAMIN D3 SUPER STRENGTH 50 MCG (2000 UT) tablet      • zolpidem (AMBIEN) 10 MG tablet Take 5 mg by mouth Daily.       No current facility-administered medications for this visit.       Review of Systems   Constitutional: Negative.    HENT: Negative.    Eyes: Negative.    Respiratory: Negative.    Cardiovascular: Negative.    Gastrointestinal: Negative.    Endocrine: Negative.    Genitourinary: Negative.    Musculoskeletal:        Foot pain   Skin: Negative.    Allergic/Immunologic: Negative.    Neurological: Negative.    Hematological: Negative.    Psychiatric/Behavioral: Negative.          OBJECTIVE    Pulse 74   Ht 177.8 cm (70\")   Wt 108 kg (239 lb)   SpO2 96%   BMI 34.29 kg/m²     Physical Exam  Vitals reviewed.   Constitutional:       General: He is not in acute distress.     Appearance: He is well-developed.   HENT:      Head: Normocephalic and atraumatic.      Nose: Nose normal.   Eyes:      Conjunctiva/sclera: Conjunctivae normal.      Pupils: Pupils are equal, round, and reactive to light.   Cardiovascular:      Pulses:           Dorsalis pedis pulses are 2+ on the right side and 2+ on the left side.        Posterior tibial pulses are 2+ on the right side and 2+ on the left side.   Pulmonary:      Effort: Pulmonary effort is normal. No respiratory distress.      Breath sounds: No wheezing.   Feet:      Right foot:      Skin integrity: Skin integrity normal.      Toenail Condition: Right toenails are ingrown.      Left foot:      Skin integrity: Skin integrity normal.      Toenail Condition: Left toenails are ingrown.      Comments: Bilateral hallux nails are incurvated and ingrowing.  Tenderness " to palpation.  Skin:     General: Skin is warm and dry.      Capillary Refill: Capillary refill takes less than 2 seconds.   Neurological:      Mental Status: He is alert and oriented to person, place, and time.   Psychiatric:         Behavior: Behavior normal.         Thought Content: Thought content normal.                Nail Removal    Date/Time: 1/4/2023 10:32 AM  Performed by: Julien Ku DPM  Authorized by: Julien Ku DPM   Consent: Verbal consent obtained. Written consent obtained.  Risks and benefits: risks, benefits and alternatives were discussed  Consent given by: patient  Patient identity confirmed: verbally with patient  Location: right foot  Location details: right big toe  Anesthesia: digital block    Anesthesia:  Local Anesthetic: lidocaine 2% without epinephrine    Sedation:  Patient sedated: no    Preparation: skin prepped with Betadine  Amount removed: partial  Nail matrix removed: partial  Dressing: antibiotic ointment and dressing applied  Patient tolerance: patient tolerated the procedure well with no immediate complications    Nail Removal    Date/Time: 1/4/2023 10:32 AM  Performed by: Julien Ku DPM  Authorized by: Julien Ku DPM   Consent: Verbal consent obtained.  Risks and benefits: risks, benefits and alternatives were discussed  Consent given by: patient  Patient identity confirmed: verbally with patient  Location: left foot  Location details: left big toe  Anesthesia: digital block    Anesthesia:  Local Anesthetic: lidocaine 2% without epinephrine    Sedation:  Patient sedated: no    Preparation: skin prepped with Betadine  Amount removed: partial  Nail matrix removed: partial  Dressing: antibiotic ointment and dressing applied  Patient tolerance: patient tolerated the procedure well with no immediate complications              ASSESSMENT AND PLAN    Diagnoses and all orders for this visit:    1. Chronic pain of toe of left foot (Primary)    2. Chronic pain of  toe of right foot    3. Ingrown toenail    Other orders  -     Nail Removal  -     Nail Removal        - Comprehensive foot and ankle exam performed  - Diagnosis, prevention and treatment of ingrown toenails discussed with patient, including risks and potential benefits of nail avulsion both temporary and permanent versus simple debridement.  - Patient elected for a partial permanent nail avulsions  - Dispensed aftercare instruction sheet  - All questions were answered and the patient is in agreement with the current treatment plan.  - RTC in 2 weeks           This document has been electronically signed by Julien Ku DPM on January 14, 2023 10:32 CST     1/14/2023  10:32 CST

## 2023-01-18 ENCOUNTER — OFFICE VISIT (OUTPATIENT)
Dept: PODIATRY | Facility: CLINIC | Age: 58
End: 2023-01-18
Payer: OTHER GOVERNMENT

## 2023-01-18 VITALS — BODY MASS INDEX: 34.22 KG/M2 | HEIGHT: 70 IN | WEIGHT: 239 LBS

## 2023-01-18 DIAGNOSIS — B35.1 ONYCHOMYCOSIS: Primary | ICD-10-CM

## 2023-01-18 PROCEDURE — 99213 OFFICE O/P EST LOW 20 MIN: CPT | Performed by: NURSE PRACTITIONER

## 2023-01-18 NOTE — PROGRESS NOTES
Nicholas Negrete  1965  57 y.o. male      01/18/2023    Chief Complaint   Patient presents with   • Left Foot - Pain, Follow-up   • Right Foot - Pain, Follow-up       History of Present Illness    Nicholas Negrete is a 57 y.o.male Patient presents to clinic for bilateral foot pain.    Patient is here for partial perm recheck of both feet.  He is doing well and progressing his activity as tolerated based on pain.  He is also wanting to discuss initiation of medication to treat the nail fungus.  He denies any hepatic failure and [facility]    Past Medical History:   Diagnosis Date   • Anxiety    • Arthritis    • Back pain    • Fibromyalgia    • GERD (gastroesophageal reflux disease)    • Hemorrhoids    • Hx of heartburn    • Hypertension    • Pain     hip, joint, neck, knee, back    • Problems with hearing    • Sleep apnea    • Sleep apnea     uses Cpap/Bipap    • Splenomegaly          Past Surgical History:   Procedure Laterality Date   • BACK SURGERY      L4 and L5 fused   • BACK SURGERY  2011   • COLONOSCOPY N/A 10/27/2017    Procedure: COLONOSCOPY;  Surgeon: Tai Hayes MD;  Location: [facility] ENDOSCOPY;  Service:    • COLONOSCOPY N/A 2/24/2020    Procedure: COLONOSCOPY;  Surgeon: Tai Hayes MD;  Location: [facility] ENDOSCOPY;  Service: Gastroenterology;  Laterality: N/A;   • ENDOSCOPY N/A 2/24/2020    Procedure: ESOPHAGOGASTRODUODENOSCOPY;  Surgeon: Tai Hayes MD;  Location: [facility] ENDOSCOPY;  Service: Gastroenterology;  Laterality: N/A;   • JOINT REPLACEMENT Left 02/2019    knee   • REPLACEMENT TOTAL KNEE Left 02/2019         Family History   Problem Relation Age of Onset   • Heart attack Father    • Heart disease Mother        Allergies   Allergen Reactions   • Penicillins Rash       Social History     Socioeconomic History   • Marital status:    Tobacco Use   • Smoking status: Never   • Smokeless tobacco: Never   Substance and Sexual Activity   •  "Alcohol use: Yes     Alcohol/week: 1.0 standard drink     Types: 1 Glasses of wine per week     Comment: occassionally   • Drug use: No   • Sexual activity: Defer         Current Outpatient Medications   Medication Sig Dispense Refill   • aspirin 81 MG chewable tablet Chew 81 mg Daily.     • omeprazole (priLOSEC) 20 MG capsule Take 1 capsule by mouth Daily. 30 capsule 5   • ondansetron (ZOFRAN) 4 MG tablet Take 1 tablet by mouth Every 8 (Eight) Hours As Needed for Nausea or Vomiting. 12 tablet 0   • Pregabalin (LYRICA PO) Take 150 mg by mouth Daily.     • pregabalin (LYRICA) 150 MG capsule      • VITAMIN D3 SUPER STRENGTH 50 MCG (2000 UT) tablet      • zolpidem (AMBIEN) 10 MG tablet Take 5 mg by mouth Daily.       No current facility-administered medications for this visit.       Review of Systems      OBJECTIVE    Ht 177.8 cm (70\")   Wt 108 kg (239 lb)   BMI 34.29 kg/m²     Physical Exam  Vitals reviewed.   Constitutional:       Appearance: Normal appearance. He is well-developed.   HENT:      Head: Normocephalic and atraumatic.   Neck:      Trachea: Trachea and phonation normal.   Cardiovascular:      Pulses:           Dorsalis pedis pulses are 2+ on the right side and 2+ on the left side.        Posterior tibial pulses are 2+ on the right side and 2+ on the left side.   Pulmonary:      Effort: Pulmonary effort is normal. No respiratory distress.   Abdominal:      General: There is no distension.      Palpations: Abdomen is soft.   Musculoskeletal:        Feet:    Feet:      Right foot:      Protective Sensation: 10 sites tested. 10 sites sensed.      Skin integrity: Skin integrity normal.      Toenail Condition: Right toenails are abnormally thick and long.      Left foot:      Protective Sensation: 10 sites tested. 10 sites sensed.      Skin integrity: Skin integrity normal.      Toenail Condition: Left toenails are abnormally thick and long.   Skin:     General: Skin is warm and dry.   Neurological:      " Mental Status: He is alert and oriented to person, place, and time.      GCS: GCS eye subscore is 4. GCS verbal subscore is 5. GCS motor subscore is 6.   Psychiatric:         Speech: Speech normal.         Behavior: Behavior normal. Behavior is cooperative.         Thought Content: Thought content normal.         Judgment: Judgment normal.                Procedures        ASSESSMENT AND PLAN    Diagnoses and all orders for this visit:    1. Onychomycosis (Primary)        [facility] [place] data and carry it back to our office for reviewing prior to proceeding with the Lamisil prescription.  He verbalized understanding of plan of care will contact the office in the meantime for any worsening symptoms.          This document has been electronically signed by NATA Chery, ONP-C on January 18, 2023 12:13 CST

## 2023-09-15 ENCOUNTER — TRANSCRIBE ORDERS (OUTPATIENT)
Dept: PULMONOLOGY | Facility: HOSPITAL | Age: 58
End: 2023-09-15
Payer: OTHER GOVERNMENT

## 2023-09-15 DIAGNOSIS — J42 CHRONIC BRONCHITIS, UNSPECIFIED CHRONIC BRONCHITIS TYPE: Primary | ICD-10-CM

## 2023-09-21 ENCOUNTER — HOSPITAL ENCOUNTER (OUTPATIENT)
Dept: PULMONOLOGY | Facility: HOSPITAL | Age: 58
Discharge: HOME | End: 2023-09-21
Admitting: INTERNAL MEDICINE
Payer: OTHER GOVERNMENT

## 2023-09-21 DIAGNOSIS — J42 CHRONIC BRONCHITIS, UNSPECIFIED CHRONIC BRONCHITIS TYPE: ICD-10-CM

## 2023-09-21 PROCEDURE — 94727 GAS DIL/WSHOT DETER LNG VOL: CPT | Performed by: INTERNAL MEDICINE

## 2023-09-21 PROCEDURE — 94010 BREATHING CAPACITY TEST: CPT | Performed by: INTERNAL MEDICINE

## 2023-09-21 PROCEDURE — 94729 DIFFUSING CAPACITY: CPT | Performed by: INTERNAL MEDICINE

## 2023-09-21 PROCEDURE — 94727 GAS DIL/WSHOT DETER LNG VOL: CPT

## 2023-09-21 PROCEDURE — 94010 BREATHING CAPACITY TEST: CPT

## 2023-12-14 NOTE — PROGRESS NOTES
"Subjective   Nicholas Negrete is a 52 y.o. male.     History of Present Illness   Nicholas Negrete is a 52 y.o. year-old who has morbid obesity and is interested in having surgical weight loss. Patient has been overweight for the past 4-5 years. The most weight ever lost was 8 pounds from not drinking soda. He has been able to keep the weight off for about 3 months. [facility] 4 months (8 visits).  Unsupervised diet attempts include: [place]'s, low fat, high protein. Supervised diet attempts include: [facility]. Patient has tried the following OTC or prescription medications for weight loss: none. Patient states he tends to eat due to physical hunger. Patient is limited in activities due to: knee pain and back pain. He is currently disabled due to \"nerve issues\".   Vitals:    03/27/18 1252   BP: 151/78   BP Location: Right arm   Patient Position: Sitting   Cuff Size: Adult   Pulse: 75   Temp: 99.8 °F (37.7 °C)   SpO2: 100%   Weight: 125 kg (275 lb 3.2 oz)   Height: 175.3 cm (69\")     He  has a past medical history of Anxiety; Arthritis; Back pain; Fibromyalgia; GERD (gastroesophageal reflux disease); Hemorrhoids; heartburn; Hypertension; Pain; Problems with hearing; Sleep apnea; and Sleep apnea.  He  does not have a problem list on file.  He  has a past surgical history that includes Back surgery; Colonoscopy (N/A, 10/27/2017); and Back surgery (2011).  His family history includes Heart attack in his father; Heart disease in his mother.  He  reports that he has never smoked. He has never used smokeless tobacco. He reports that he drinks about 0.6 oz of alcohol per week . He reports that he does not use drugs.  Current Outpatient Prescriptions   Medication Sig Dispense Refill   • aspirin 81 MG chewable tablet Chew 81 mg Daily.     • atenolol (TENORMIN) 25 MG tablet Take 25 mg by mouth Daily.     • lisinopril (PRINIVIL,ZESTRIL) 40 MG tablet Take 40 mg by mouth " Daily.     • meloxicam (MOBIC) 15 MG tablet Take 15 mg by mouth Daily.     • OMEPRAZOLE PO Take 20 mg by mouth As Needed.     • Pregabalin (LYRICA PO) Take 150 mg by mouth Daily.     • zolpidem (AMBIEN) 10 MG tablet Take 5 mg by mouth Daily.       No current facility-administered medications for this visit.      He is allergic to penicillins.    The following portions of the patient's history were reviewed and updated as appropriate: allergies, current medications, past family history, past medical history, past social history, past surgical history and problem list.    Review of Systems   Constitutional: Negative for activity change, appetite change and fatigue.   HENT: Positive for hearing loss.    Eyes: Negative.    Respiratory: Positive for apnea. Negative for cough, chest tightness and shortness of breath.         LORI and uses CPAP   Cardiovascular: Negative.  Negative for chest pain, palpitations and leg swelling.        HTN   Gastrointestinal: Negative.  Negative for abdominal pain, anal bleeding, constipation, nausea and vomiting.        Heartburn   Endocrine: Negative.    Genitourinary: Negative.    Musculoskeletal: Positive for arthralgias, back pain and neck pain.   Skin: Negative.    Allergic/Immunologic: Negative.    Neurological: Positive for numbness. Negative for seizures and syncope.   Hematological: Negative.  Does not bruise/bleed easily.   Psychiatric/Behavioral: Negative for dysphoric mood, self-injury, sleep disturbance and suicidal ideas. The patient is nervous/anxious.        Objective   Physical Exam   Constitutional: He is oriented to person, place, and time. Vital signs are normal. He appears well-developed and well-nourished. He is cooperative. No distress.   HENT:   Head: Normocephalic and atraumatic.   Nose: Nose normal.   Mouth/Throat: Oropharynx is clear and moist. No oropharyngeal exudate or tonsillar abscesses.   Eyes: Conjunctivae, EOM and lids are normal. Pupils are equal, round,  and reactive to light. Right eye exhibits no discharge. Left eye exhibits no discharge.   Neck: Trachea normal. Neck supple. No JVD present. Carotid bruit is not present. No no neck rigidity. No tracheal deviation present. No thyromegaly present.   Cardiovascular: Normal rate, regular rhythm, S1 normal, S2 normal and normal heart sounds.    Pulmonary/Chest: Effort normal and breath sounds normal. No stridor. No respiratory distress. He has no wheezes. He has no rales.   Abdominal: Soft. Bowel sounds are normal. He exhibits no distension. There is no tenderness.   obese   Musculoskeletal: He exhibits no edema.        Right shoulder: He exhibits normal strength.   Lymphadenopathy:     He has no cervical adenopathy.   Neurological: He is alert and oriented to person, place, and time. He has normal strength. No cranial nerve deficit.   Skin: Skin is warm, dry and intact. No rash noted.   Psychiatric: He has a normal mood and affect. His speech is normal and behavior is normal.   Alert and oriented x 3   Vitals reviewed.      Assessment/Plan   Nicholas was seen today for consult, obesity, nutrition counseling and weight loss.    Diagnoses and all orders for this visit:    Obesity, Class III, BMI 40-49.9 (morbid obesity)  -     Bariatric Nutritional Counseling; Standing  -     Ambulatory Referral to Psychology    Pre-bariatric surgery psychological evaluation  -     Ambulatory Referral to Psychology    Essential hypertension    LORI on CPAP          Nicholas Negrete is a  52 y.o. who has morbid obesity with BMI of 40.6 and desires surgical weight loss. Patient has the following comorbidities: HTN, LORI, joint, back pain.  Patient has been advised that this surgery is considered to be elective major surgery that is typically done laparoscopically. Patient is a potentially good surgical candidate. Patient will need to meet with the Bariatric Surgeon to further discuss surgical options. Patient has been advised that they  will need to have a work-up prior to surgery. This work-up will include an EGD to assess for H. Pylori and Lopez's esophagus. Additionally, patient will need to have a psychological evaluation and clearance prior to surgery. Patient will need the following additional clearances/testing: EKG. Baseline lab work will not be obtained today as PCP is following. Patient will see the dietician today for make specific goals for diet, exercise, and lifestyle. Patient has received intensive behavioral therapy for obesity today. I will have them follow up in one month for a weight recheck and to further discuss options.         Female

## (undated) DEVICE — CANN SMPL SOFTECH BIFLO ETCO2 A/M 7FT

## (undated) DEVICE — TRAP SXN POLYP QUICKCATCH LF

## (undated) DEVICE — BITEBLOCK ENDO W/STRAP 60F A/ LF DISP

## (undated) DEVICE — Device: Brand: DISPOSABLE ELECTROSURGICAL SNARE

## (undated) DEVICE — SINGLE-USE BIOPSY FORCEPS: Brand: RADIAL JAW 4